# Patient Record
Sex: MALE | Race: WHITE | Employment: OTHER | URBAN - METROPOLITAN AREA
[De-identification: names, ages, dates, MRNs, and addresses within clinical notes are randomized per-mention and may not be internally consistent; named-entity substitution may affect disease eponyms.]

---

## 2017-04-26 ENCOUNTER — TELEPHONE (OUTPATIENT)
Dept: CARDIOLOGY CLINIC | Age: 82
End: 2017-04-26

## 2017-04-27 ENCOUNTER — TELEPHONE (OUTPATIENT)
Dept: CARDIOLOGY CLINIC | Age: 82
End: 2017-04-27

## 2017-05-01 ENCOUNTER — OFFICE VISIT (OUTPATIENT)
Dept: CARDIOLOGY CLINIC | Age: 82
End: 2017-05-01

## 2017-05-01 VITALS
DIASTOLIC BLOOD PRESSURE: 58 MMHG | HEART RATE: 64 BPM | OXYGEN SATURATION: 97 % | WEIGHT: 134 LBS | BODY MASS INDEX: 22.33 KG/M2 | SYSTOLIC BLOOD PRESSURE: 118 MMHG | HEIGHT: 65 IN

## 2017-05-01 DIAGNOSIS — I25.10 CORONARY ARTERY DISEASE INVOLVING NATIVE CORONARY ARTERY OF NATIVE HEART WITHOUT ANGINA PECTORIS: Primary | ICD-10-CM

## 2017-05-01 DIAGNOSIS — R55 SYNCOPE, UNSPECIFIED SYNCOPE TYPE: ICD-10-CM

## 2017-05-01 DIAGNOSIS — I48.0 PAROXYSMAL ATRIAL FIBRILLATION (HCC): ICD-10-CM

## 2017-05-01 PROCEDURE — 4040F PNEUMOC VAC/ADMIN/RCVD: CPT | Performed by: NURSE PRACTITIONER

## 2017-05-01 PROCEDURE — 1123F ACP DISCUSS/DSCN MKR DOCD: CPT | Performed by: NURSE PRACTITIONER

## 2017-05-01 PROCEDURE — 99214 OFFICE O/P EST MOD 30 MIN: CPT | Performed by: NURSE PRACTITIONER

## 2017-05-01 PROCEDURE — G8598 ASA/ANTIPLAT THER USED: HCPCS | Performed by: NURSE PRACTITIONER

## 2017-05-01 PROCEDURE — G8419 CALC BMI OUT NRM PARAM NOF/U: HCPCS | Performed by: NURSE PRACTITIONER

## 2017-05-01 PROCEDURE — G8427 DOCREV CUR MEDS BY ELIG CLIN: HCPCS | Performed by: NURSE PRACTITIONER

## 2017-05-01 PROCEDURE — 1036F TOBACCO NON-USER: CPT | Performed by: NURSE PRACTITIONER

## 2017-05-02 ENCOUNTER — TELEPHONE (OUTPATIENT)
Dept: CARDIOLOGY CLINIC | Age: 82
End: 2017-05-02

## 2017-06-12 ENCOUNTER — OFFICE VISIT (OUTPATIENT)
Dept: CARDIOLOGY CLINIC | Age: 82
End: 2017-06-12

## 2017-06-12 VITALS
HEIGHT: 65 IN | SYSTOLIC BLOOD PRESSURE: 112 MMHG | OXYGEN SATURATION: 94 % | DIASTOLIC BLOOD PRESSURE: 60 MMHG | HEART RATE: 60 BPM

## 2017-06-12 DIAGNOSIS — Z86.73 HISTORY OF CVA (CEREBROVASCULAR ACCIDENT): ICD-10-CM

## 2017-06-12 DIAGNOSIS — R55 SYNCOPE AND COLLAPSE: ICD-10-CM

## 2017-06-12 DIAGNOSIS — I48.0 PAROXYSMAL ATRIAL FIBRILLATION (HCC): Primary | ICD-10-CM

## 2017-06-12 DIAGNOSIS — Z45.09 ENCOUNTER FOR ELECTRONIC ANALYSIS OF REVEAL EVENT RECORDER: ICD-10-CM

## 2017-06-12 PROCEDURE — 93291 INTERROG DEV EVAL SCRMS IP: CPT | Performed by: INTERNAL MEDICINE

## 2017-06-12 PROCEDURE — G8427 DOCREV CUR MEDS BY ELIG CLIN: HCPCS | Performed by: INTERNAL MEDICINE

## 2017-06-12 PROCEDURE — G8598 ASA/ANTIPLAT THER USED: HCPCS | Performed by: INTERNAL MEDICINE

## 2017-06-12 PROCEDURE — 4040F PNEUMOC VAC/ADMIN/RCVD: CPT | Performed by: INTERNAL MEDICINE

## 2017-06-12 PROCEDURE — 1036F TOBACCO NON-USER: CPT | Performed by: INTERNAL MEDICINE

## 2017-06-12 PROCEDURE — 99214 OFFICE O/P EST MOD 30 MIN: CPT | Performed by: INTERNAL MEDICINE

## 2017-06-12 PROCEDURE — G8420 CALC BMI NORM PARAMETERS: HCPCS | Performed by: INTERNAL MEDICINE

## 2017-06-12 PROCEDURE — 93000 ELECTROCARDIOGRAM COMPLETE: CPT | Performed by: INTERNAL MEDICINE

## 2017-06-12 PROCEDURE — 1123F ACP DISCUSS/DSCN MKR DOCD: CPT | Performed by: INTERNAL MEDICINE

## 2017-12-08 ENCOUNTER — PROCEDURE VISIT (OUTPATIENT)
Dept: CARDIOLOGY CLINIC | Age: 82
End: 2017-12-08

## 2017-12-08 ENCOUNTER — OFFICE VISIT (OUTPATIENT)
Dept: CARDIOLOGY CLINIC | Age: 82
End: 2017-12-08

## 2017-12-08 VITALS
SYSTOLIC BLOOD PRESSURE: 120 MMHG | HEART RATE: 66 BPM | OXYGEN SATURATION: 93 % | DIASTOLIC BLOOD PRESSURE: 78 MMHG | HEIGHT: 65 IN

## 2017-12-08 DIAGNOSIS — R55 SYNCOPE AND COLLAPSE: Primary | ICD-10-CM

## 2017-12-08 DIAGNOSIS — Z45.09 ENCOUNTER FOR ELECTRONIC ANALYSIS OF REVEAL EVENT RECORDER: ICD-10-CM

## 2017-12-08 DIAGNOSIS — R55 SYNCOPE AND COLLAPSE: ICD-10-CM

## 2017-12-08 DIAGNOSIS — I48.0 PAROXYSMAL ATRIAL FIBRILLATION (HCC): ICD-10-CM

## 2017-12-08 PROCEDURE — 4040F PNEUMOC VAC/ADMIN/RCVD: CPT | Performed by: INTERNAL MEDICINE

## 2017-12-08 PROCEDURE — 99213 OFFICE O/P EST LOW 20 MIN: CPT | Performed by: INTERNAL MEDICINE

## 2017-12-08 PROCEDURE — G8484 FLU IMMUNIZE NO ADMIN: HCPCS | Performed by: INTERNAL MEDICINE

## 2017-12-08 PROCEDURE — 1123F ACP DISCUSS/DSCN MKR DOCD: CPT | Performed by: INTERNAL MEDICINE

## 2017-12-08 PROCEDURE — G8598 ASA/ANTIPLAT THER USED: HCPCS | Performed by: INTERNAL MEDICINE

## 2017-12-08 PROCEDURE — G8421 BMI NOT CALCULATED: HCPCS | Performed by: INTERNAL MEDICINE

## 2017-12-08 PROCEDURE — G8427 DOCREV CUR MEDS BY ELIG CLIN: HCPCS | Performed by: INTERNAL MEDICINE

## 2017-12-08 PROCEDURE — 1036F TOBACCO NON-USER: CPT | Performed by: INTERNAL MEDICINE

## 2017-12-08 PROCEDURE — 93291 INTERROG DEV EVAL SCRMS IP: CPT | Performed by: INTERNAL MEDICINE

## 2017-12-08 NOTE — PROGRESS NOTES
procedure(V57.89); Personal history of surgery to other organs; Pneumonia; Sepsis(995.91); Stricture and stenosis of esophagus; Type II or unspecified type diabetes mellitus without mention of complication, not stated as uncontrolled; Type II or unspecified type diabetes mellitus without mention of complication, not stated as uncontrolled; Unspecified hypothyroidism; and Urinary tract infection, site not specified. Surgical History:   has a past surgical history that includes hernia repair; Cardiac catheterization (8/11/15); and other surgical history (8/11/15). Social History:   reports that he has quit smoking. He has quit using smokeless tobacco. He reports that he does not drink alcohol or use drugs. Family History:  family history is not on file.      Home Medications:  Outpatient Encounter Prescriptions as of 12/8/2017   Medication Sig Dispense Refill    glimepiride (AMARYL) 1 MG tablet Take 1 mg by mouth every morning (before breakfast)      acetaminophen (TYLENOL) 325 MG tablet Take 650 mg by mouth every 6 hours as needed for Pain      atorvastatin (LIPITOR) 40 MG tablet Take 40 mg by mouth daily      Calcium Carbonate-Vitamin D (CALTRATE 600+D) 600-400 MG-UNIT CHEW Take by mouth      ferrous sulfate 325 (65 FE) MG tablet Take 325 mg by mouth daily (with breakfast)      pantoprazole sodium (PROTONIX) 40 MG PACK packet Take 40 mg by mouth every morning (before breakfast)      guaiFENesin-dextromethorphan (ROBITUSSIN DM) 100-10 MG/5ML syrup Take 5 mLs by mouth 3 times daily as needed for Cough      Cholecalciferol (VITAMIN D3) 2000 UNITS CAPS Take by mouth      lisinopril (PRINIVIL;ZESTRIL) 5 MG tablet TAKE 1 TABLET BY MOUTH DAILY 90 tablet 3    levothyroxine (SYNTHROID) 50 MCG tablet TAKE 1 TABLET BY MOUTH DAILY 90 tablet 3    isosorbide mononitrate (IMDUR) 30 MG CR tablet TAKE 1 TABLET BY MOUTH DAILY 90 tablet 5    ammonium lactate (LAC-HYDRIN) 12 % lotion Apply  topically 3 times daily

## 2017-12-08 NOTE — PROGRESS NOTES
Interrogation of the device shows normal function. See interrogation for details. The pt will see Dr Ashanti Pérez today.

## 2018-09-29 ENCOUNTER — HOSPITAL ENCOUNTER (EMERGENCY)
Age: 83
Discharge: HOME | End: 2018-09-29
Payer: MEDICARE

## 2018-09-29 ENCOUNTER — APPOINTMENT (OUTPATIENT)
Dept: CT IMAGING | Age: 83
End: 2018-09-29
Payer: MEDICARE

## 2018-09-29 ENCOUNTER — APPOINTMENT (OUTPATIENT)
Dept: GENERAL RADIOLOGY | Age: 83
End: 2018-09-29
Payer: MEDICARE

## 2018-09-29 VITALS
WEIGHT: 129.85 LBS | RESPIRATION RATE: 18 BRPM | SYSTOLIC BLOOD PRESSURE: 123 MMHG | DIASTOLIC BLOOD PRESSURE: 64 MMHG | TEMPERATURE: 97.8 F | OXYGEN SATURATION: 97 % | HEART RATE: 58 BPM | BODY MASS INDEX: 21.61 KG/M2

## 2018-09-29 DIAGNOSIS — R82.71 BACTERIURIA: ICD-10-CM

## 2018-09-29 DIAGNOSIS — R05.9 COUGH: ICD-10-CM

## 2018-09-29 DIAGNOSIS — S00.83XA TRAUMATIC HEMATOMA OF FOREHEAD, INITIAL ENCOUNTER: ICD-10-CM
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
## 2018-09-29 DIAGNOSIS — S89.91XA RIGHT KNEE INJURY, INITIAL ENCOUNTER: ICD-10-CM

## 2018-09-29 LAB
BILIRUBIN URINE: NEGATIVE
BLOOD, URINE: ABNORMAL
CLARITY: ABNORMAL
COLOR: YELLOW
EPITHELIAL CELLS, UA: 0 /HPF (ref 0–5)
GLUCOSE URINE: NEGATIVE MG/DL
HYALINE CASTS: 0 /LPF (ref 0–8)
KETONES, URINE: NEGATIVE MG/DL
LEUKOCYTE ESTERASE, URINE: ABNORMAL
MICROSCOPIC EXAMINATION: YES
NITRITE, URINE: NEGATIVE
PH UA: 6
PROTEIN UA: NEGATIVE MG/DL
RBC UA: 1 /HPF (ref 0–4)
SPECIFIC GRAVITY UA: 1.01
URINE REFLEX TO CULTURE: YES
URINE TYPE: ABNORMAL
UROBILINOGEN, URINE: 0.2 E.U./DL
WBC UA: 26 /HPF (ref 0–5)

## 2018-09-29 PROCEDURE — 81001 URINALYSIS AUTO W/SCOPE: CPT

## 2018-09-29 PROCEDURE — 87077 CULTURE AEROBIC IDENTIFY: CPT

## 2018-09-29 PROCEDURE — 72125 CT NECK SPINE W/O DYE: CPT

## 2018-09-29 PROCEDURE — 87086 URINE CULTURE/COLONY COUNT: CPT

## 2018-09-29 PROCEDURE — 99284 EMERGENCY DEPT VISIT MOD MDM: CPT

## 2018-09-29 PROCEDURE — 87186 SC STD MICRODIL/AGAR DIL: CPT

## 2018-09-29 PROCEDURE — 70450 CT HEAD/BRAIN W/O DYE: CPT

## 2018-09-29 PROCEDURE — 90471 IMMUNIZATION ADMIN: CPT | Performed by: PHYSICIAN ASSISTANT

## 2018-09-29 PROCEDURE — 6360000002 HC RX W HCPCS: Performed by: PHYSICIAN ASSISTANT

## 2018-09-29 PROCEDURE — 90715 TDAP VACCINE 7 YRS/> IM: CPT | Performed by: PHYSICIAN ASSISTANT

## 2018-09-29 PROCEDURE — 73560 X-RAY EXAM OF KNEE 1 OR 2: CPT

## 2018-09-29 PROCEDURE — 71045 X-RAY EXAM CHEST 1 VIEW: CPT

## 2018-09-29 PROCEDURE — 4500000024 HC ED LEVEL 4 PROCEDURE

## 2018-09-29 RX ORDER — LEVOFLOXACIN 750 MG/1
750 TABLET ORAL DAILY
Qty: 5 TABLET | Refills: 0 | Status: SHIPPED | OUTPATIENT
Start: 2018-09-29 | End: 2018-10-04

## 2018-09-29 RX ORDER — GUAIFENESIN/DEXTROMETHORPHAN 100-10MG/5
5 SYRUP ORAL 3 TIMES DAILY PRN
Qty: 120 ML | Refills: 0 | Status: SHIPPED | OUTPATIENT
Start: 2018-09-29 | End: 2018-10-09

## 2018-09-29 RX ADMIN — TETANUS TOXOID, REDUCED DIPHTHERIA TOXOID AND ACELLULAR PERTUSSIS VACCINE, ADSORBED 0.5 ML: 5; 2.5; 8; 8; 2.5 SUSPENSION INTRAMUSCULAR at 09:22

## 2018-09-29 ASSESSMENT — PAIN DESCRIPTION - PAIN TYPE: TYPE: ACUTE PAIN

## 2018-09-29 ASSESSMENT — PAIN SCALES - GENERAL: PAINLEVEL_OUTOF10: 5

## 2018-09-29 ASSESSMENT — PAIN DESCRIPTION - LOCATION: LOCATION: HEAD

## 2018-09-29 NOTE — ED NOTES
Fall risk screening completed. Fall risk bracelet applied to patient. Non-skid socks provided and placed on patient. The call light is within the patient's reach, and instructions for use were provided. The bed is in the lowest position with wheels locked. The patient has been advised to notify staff, using the call light, if there is a need to get up or use restroom. The patient verbalized understanding of safety precautions and how to contact staff for assistance.         Cristian Petersen RN  09/29/18 [id number]

## 2018-09-29 NOTE — ED PROVIDER NOTES
times daily as needed for Wheezing or Shortness of Breath. ALENDRONATE (FOSAMAX) 70 MG TABLET    TAKE 1 TABLET BY MOUTH ONCE A WEEK AS DIRECTED    AMLODIPINE (NORVASC) 10 MG TABLET    TAKE 1 TABLET BY MOUTH EVERY DAY for blood pressure    AMMONIUM LACTATE (LAC-HYDRIN) 12 % LOTION    Apply  topically 3 times daily as needed for Dry Skin. Apply topically daily. ATORVASTATIN (LIPITOR) 40 MG TABLET    Take 40 mg by mouth daily    CALCIUM CARBONATE-VITAMIN D (CALTRATE 600+D) 600-400 MG-UNIT CHEW    Take by mouth    CHOLECALCIFEROL (VITAMIN D3) 2000 UNITS CAPS    Take by mouth    CLOPIDOGREL (PLAVIX) 75 MG TABLET    TAKE ONE TABLET BY MOUTH DAILY    FERROUS SULFATE 325 (65 FE) MG TABLET    Take 325 mg by mouth daily (with breakfast)    GLIMEPIRIDE (AMARYL) 1 MG TABLET    Take 1 mg by mouth every morning (before breakfast)    GUAIFENESIN-DEXTROMETHORPHAN (ROBITUSSIN DM) 100-10 MG/5ML SYRUP    Take 5 mLs by mouth 3 times daily as needed for Cough    HYDROCHLOROTHIAZIDE (HYDRODIURIL) 50 MG TABLET    TAKE 1 TABLET BY MOUTH DAILY    ISOSORBIDE MONONITRATE (IMDUR) 30 MG CR TABLET    TAKE 1 TABLET BY MOUTH DAILY    LEVOTHYROXINE (SYNTHROID) 50 MCG TABLET    TAKE 1 TABLET BY MOUTH DAILY    LISINOPRIL (PRINIVIL;ZESTRIL) 5 MG TABLET    TAKE 1 TABLET BY MOUTH DAILY    NITROGLYCERIN (NITROSTAT) 0.4 MG SL TABLET    Place 1 tablet under the tongue every 5 minutes as needed for Chest pain. PANTOPRAZOLE SODIUM (PROTONIX) 40 MG PACK PACKET    Take 40 mg by mouth every morning (before breakfast)    POTASSIUM CHLORIDE (K-DUR) 10 MEQ TABLET    Take 1 tablet by mouth daily (with breakfast) for 7 days. ALLERGIES     Ppd [tuberculin purified protein derivative]    FAMILY HISTORY     History reviewed. No pertinent family history. No family status information on file. SOCIAL HISTORY      reports that he has quit smoking. He has quit using smokeless tobacco. He reports that he does not drink alcohol or use drugs.     PHYSICAL

## 2018-09-29 NOTE — ED NOTES
Patient returned from [email or web address] and [facility].      Ruddy Lim RN  09/29/18 8222

## 2018-10-01 LAB
ORGANISM: ABNORMAL
URINE CULTURE, ROUTINE: ABNORMAL
URINE CULTURE, ROUTINE: ABNORMAL

## 2019-05-27 ENCOUNTER — APPOINTMENT (OUTPATIENT)
Dept: GENERAL RADIOLOGY | Age: 84
End: 2019-05-27
Payer: MEDICARE

## 2019-05-27 ENCOUNTER — HOSPITAL ENCOUNTER (EMERGENCY)
Age: 84
Discharge: HOME | End: 2019-05-27
Attending: EMERGENCY MEDICINE
Payer: MEDICARE

## 2019-05-27 VITALS
DIASTOLIC BLOOD PRESSURE: 62 MMHG | TEMPERATURE: 98.5 F | HEART RATE: 79 BPM | WEIGHT: 116.84 LBS | RESPIRATION RATE: 16 BRPM | OXYGEN SATURATION: 97 % | SYSTOLIC BLOOD PRESSURE: 129 MMHG | BODY MASS INDEX: 19.44 KG/M2

## 2019-05-27 DIAGNOSIS — J18.9 PNEUMONIA DUE TO ORGANISM: Primary | ICD-10-CM

## 2019-05-27 DIAGNOSIS — R41.82 ALTERED MENTAL STATUS, UNSPECIFIED ALTERED MENTAL STATUS TYPE: ICD-10-CM

## 2019-05-27 DIAGNOSIS — R50.9 FEVER, UNSPECIFIED FEVER CAUSE: ICD-10-CM

## 2019-05-27 LAB
AMORPHOUS: ABNORMAL /HPF
BILIRUBIN URINE: NEGATIVE
BLOOD, URINE: ABNORMAL
CLARITY: ABNORMAL
COLOR: YELLOW
EPITHELIAL CELLS, UA: 4 /HPF (ref 0–5)
GLUCOSE URINE: NEGATIVE MG/DL
KETONES, URINE: NEGATIVE MG/DL
LEUKOCYTE ESTERASE, URINE: NEGATIVE
MICROSCOPIC EXAMINATION: YES
NITRITE, URINE: NEGATIVE
PH UA: 5 (ref 5–8)
PROTEIN UA: 100 MG/DL
RBC UA: 2 /HPF (ref 0–4)
SPECIFIC GRAVITY UA: 1.02 (ref 1–1.03)
URINE REFLEX TO CULTURE: ABNORMAL
URINE TYPE: ABNORMAL
UROBILINOGEN, URINE: 0.2 E.U./DL
WBC UA: 1 /HPF (ref 0–5)

## 2019-05-27 PROCEDURE — 99285 EMERGENCY DEPT VISIT HI MDM: CPT

## 2019-05-27 PROCEDURE — 81001 URINALYSIS AUTO W/SCOPE: CPT

## 2019-05-27 PROCEDURE — 71045 X-RAY EXAM CHEST 1 VIEW: CPT

## 2019-05-27 PROCEDURE — 6370000000 HC RX 637 (ALT 250 FOR IP): Performed by: EMERGENCY MEDICINE

## 2019-05-27 RX ORDER — AZITHROMYCIN 250 MG/1
250 TABLET, FILM COATED ORAL SEE ADMIN INSTRUCTIONS
Qty: 6 TABLET | Refills: 0 | Status: SHIPPED | OUTPATIENT
Start: 2019-05-27 | End: 2019-06-01

## 2019-05-27 RX ORDER — ACETAMINOPHEN 650 MG/1
650 SUPPOSITORY RECTAL ONCE
Status: COMPLETED | OUTPATIENT
Start: 2019-05-27 | End: 2019-05-27

## 2019-05-27 RX ADMIN — ACETAMINOPHEN 650 MG: 650 SUPPOSITORY RECTAL at 16:25

## 2019-05-27 ASSESSMENT — PAIN SCALES - GENERAL
PAINLEVEL_OUTOF10: 0
PAINLEVEL_OUTOF10: 0

## 2019-05-27 NOTE — ED NOTES
[facility].      Shelbi Gallo RN  05/27/19 8300

## 2019-05-27 NOTE — ED PROVIDER NOTES
CHIEF COMPLAINT  Altered Mental Status (Patient sent in from [facility] for altered mental status. Per [facility] patient is sometimes combative and verbally abusive. Patient is refusing care at the nursing home and here at this time. Patient has a rectal temp of 102.7 at this time. Patient is a dnr-cc and is in [facility]ice [facility]. Patient is refusing care at this time here and at the nursing home.  )      [address] [place] [address] Rd is a 80 y.o. male who presents to the ED from [facility]. Patient was sent over for concerns of altered mental status. Patient has not been talking as much per staff at nursing facility. Patient also not putting on close or using the restroom appropriately. Patient was urinating on himself. Per nursing staff at patient's care facility, patient requested to come to the hospital.  Patient does occasionally answer questions with nodding his head yes and no. Patient oriented to person and place but not time. Patient is DNR CC and DNR CC paperwork is in the hospital with the patient. No other complaints, modifying factors or associated symptoms. Nursing notes reviewed.    Past Medical History:   Diagnosis Date    Arthritis     Backache, unspecified     CHF (congestive heart failure) (HCC)     LVEF 40-45%    Coronary atherosclerosis of unspecified type of vessel, native or graft     Debility, unspecified     Dysphagia, oropharyngeal phase     Dysphagia, unspecified(787.20)     Esophageal reflux     H/O: CVA     left hemiparesis    Hemiplegia affecting unspecified side, late effect of cerebrovascular disease     Hyperlipidemia     Hypertension     Hypothyroid     Muscular wasting and disuse atrophy, not elsewhere classified     Nonspecific reaction to tuberculin skin test without active tuberculosis(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 Current Outpatient Medications   Medication Sig Dispense Refill    azithromycin (ZITHROMAX) 250 MG tablet Take 1 tablet by mouth See Admin Instructions for 5 days 500mg on day 1 followed by 250mg on days 2 - 5 6 tablet 0    glimepiride (AMARYL) 1 MG tablet Take 1 mg by mouth every morning (before breakfast)      acetaminophen (TYLENOL) 325 MG tablet Take 650 mg by mouth every 6 hours as needed for Pain      atorvastatin (LIPITOR) 40 MG tablet Take 40 mg by mouth daily      Calcium Carbonate-Vitamin D (CALTRATE 600+D) 600-400 MG-UNIT CHEW Take by mouth      ferrous sulfate 325 (65 FE) MG tablet Take 325 mg by mouth daily (with breakfast)      pantoprazole sodium (PROTONIX) 40 MG PACK packet Take 40 mg by mouth every morning (before breakfast)      guaiFENesin-dextromethorphan (ROBITUSSIN DM) 100-10 MG/5ML syrup Take 5 mLs by mouth 3 times daily as needed for Cough      Cholecalciferol (VITAMIN D3) 2000 UNITS CAPS Take by mouth      potassium chloride (K-DUR) 10 MEQ tablet Take 1 tablet by mouth daily (with breakfast) for 7 days. 7 tablet 0    lisinopril (PRINIVIL;ZESTRIL) 5 MG tablet TAKE 1 TABLET BY MOUTH DAILY 90 tablet 3    levothyroxine (SYNTHROID) 50 MCG tablet TAKE 1 TABLET BY MOUTH DAILY 90 tablet 3    isosorbide mononitrate (IMDUR) 30 MG CR tablet TAKE 1 TABLET BY MOUTH DAILY 90 tablet 5    ammonium lactate (LAC-HYDRIN) 12 % lotion Apply  topically 3 times daily as needed for Dry Skin. Apply topically daily.  1 Bottle 3    clopidogrel (PLAVIX) 75 MG tablet TAKE ONE TABLET BY MOUTH DAILY 30 tablet 5    amLODIPine (NORVASC) 10 MG tablet TAKE 1 TABLET BY MOUTH EVERY DAY for blood pressure 30 tablet 12    hydrochlorothiazide (HYDRODIURIL) 50 MG tablet TAKE 1 TABLET BY MOUTH DAILY 90 tablet 3    alendronate (FOSAMAX) 70 MG tablet ACS/MI    80-year-old male who is DNR CC presents from nursing facility for concerns of altered mental status. Patient was uncooperative for them and was sent for evaluation. Patient not answering questions verbally but doesn't answer with yes and nos are nodding. Patient febrile via rectal temperature. Patient was treated with Tylenol rectal suppository has refused to take anything orally. I feel patient may have a urinary tract infection and this can be treated with oral antibiotics. We'll attempt to obtain a urine sample. I do not feel it is appropriate to attempt to draw blood as this would be against patient's DNR CC as it would cost patient pain. ED Course as of May 27 2216   Mon May 27, 2019   1618 Spoke with RN, Priscilla Oliveira, who was helping care for Mr. Marie at [facility]. She states they were going to perform a workup at the facility the patient was refusing any care. Patient does have DNR CC paperwork with him. Discussed with nursing staff at [facility] that patient has a fever and will be treated with a rectal suppository. Discussed transferring patient back to nursing facility as there facility is capable of taking care of patient and performing testing as needed. They state understanding and agree with transfer back to facility. Will check urine if possible prior to transfer. [DS]   9220 Patient much more cooperative. Patient asking for water. Patient allowed for straight cath as well. We'll check urine and then plan for discharge back to his facility. [DS]   1875 Patient's chest x-ray shows possible pneumonia. Patient prescribed a course of azithromycin. No further workup at this time as patient is DNR CC.    [DS]   [address] Patient's family members at bedside. Discussed care plan and not obtaining labs or poking patient for blood as he is DNR CC. They did agree with this care plan.   Discussed that we treat patient with Tylenol and will be starting him on azithromycin for pneumonia. They're comfortable with treating the patient with antibiotic and Tylenol as needed to keep patient comfortable. Patient much more cooperative after fever decreased with Tylenol. Patient will be discharged back to facility. [DS]      ED Course User Index  [DS] Rashid Biggs MD         Patient was given scripts for the following medications. I counseled patient how to take these medications. Discharge Medication List as of 5/27/2019  7:03 PM      START taking these medications    Details   azithromycin (ZITHROMAX) 250 MG tablet Take 1 tablet by mouth See Admin Instructions for 5 days 500mg on day 1 followed by 250mg on days 2 - 5, Disp-6 tablet, R-0Print                 CLINICAL IMPRESSION  1. Pneumonia due to organism    2. Fever, unspecified fever cause    3. Altered mental status, unspecified altered mental status type        Blood pressure 129/62, pulse 79, temperature 98.5 °F (36.9 °C), temperature source Axillary, resp. rate 16, weight 116 lb 13.5 oz (53 kg), SpO2 97 %. DISPOSITION  Patient was discharged to home in good condition. Disclaimer: All medical record entries made by [employer] dictation.       (Please note that this note was completed with a voice recognition program. Every attempt was made to edit the dictations, but inevitably there remain words that are mis-transcribed.)            Rashid Biggs MD  05/27/19 3278

## 2019-07-26 ENCOUNTER — APPOINTMENT (OUTPATIENT)
Dept: GENERAL RADIOLOGY | Age: 84
DRG: 280 | End: 2019-07-26
Payer: MEDICARE

## 2019-07-26 ENCOUNTER — HOSPITAL ENCOUNTER (INPATIENT)
Age: 84
LOS: 4 days | Discharge: NURSING, REHAB OR LONG-TERM CARE | DRG: 280 | End: 2019-07-30
Attending: EMERGENCY MEDICINE | Admitting: INTERNAL MEDICINE
Payer: MEDICARE

## 2019-07-26 DIAGNOSIS — N17.9 AKI (ACUTE KIDNEY INJURY) (HCC): ICD-10-CM

## 2019-07-26 DIAGNOSIS — I21.4 NSTEMI (NON-ST ELEVATED MYOCARDIAL INFARCTION) (HCC): ICD-10-CM

## 2019-07-26 DIAGNOSIS — E87.5 HYPERKALEMIA: ICD-10-CM

## 2019-07-26 DIAGNOSIS — R06.02 SHORTNESS OF BREATH: Primary | ICD-10-CM

## 2019-07-26 DIAGNOSIS — I50.9 CONGESTIVE HEART FAILURE, UNSPECIFIED HF CHRONICITY, UNSPECIFIED HEART FAILURE TYPE (HCC): ICD-10-CM

## 2019-07-26 LAB
A/G RATIO: 1 (ref 1.1–2.2)
ALBUMIN SERPL-MCNC: 3.7 G/DL (ref 3.4–5)
ALP BLD-CCNC: 84 U/L (ref 40–129)
ALT SERPL-CCNC: 11 U/L (ref 10–40)
ANION GAP SERPL CALCULATED.3IONS-SCNC: 15 MMOL/L (ref 3–16)
ANISOCYTOSIS: ABNORMAL
APTT: 26.8 SEC (ref 26–36)
APTT: 95.1 SEC (ref 26–36)
AST SERPL-CCNC: 26 U/L (ref 15–37)
BASOPHILS ABSOLUTE: 0 K/UL (ref 0–0.2)
BASOPHILS RELATIVE PERCENT: 0.3 %
BILIRUB SERPL-MCNC: 0.7 MG/DL (ref 0–1)
BUN BLDV-MCNC: 69 MG/DL (ref 7–20)
CALCIUM SERPL-MCNC: 9.5 MG/DL (ref 8.3–10.6)
CHLORIDE BLD-SCNC: 105 MMOL/L (ref 99–110)
CO2: 20 MMOL/L (ref 21–32)
CREAT SERPL-MCNC: 1.8 MG/DL (ref 0.8–1.3)
EKG ATRIAL RATE: 208 BPM
EKG ATRIAL RATE: 83 BPM
EKG DIAGNOSIS: NORMAL
EKG DIAGNOSIS: NORMAL
EKG Q-T INTERVAL: 412 MS
EKG Q-T INTERVAL: 414 MS
EKG QRS DURATION: 122 MS
EKG QRS DURATION: 132 MS
EKG QTC CALCULATION (BAZETT): 449 MS
EKG QTC CALCULATION (BAZETT): 460 MS
EKG R AXIS: -47 DEGREES
EKG R AXIS: -72 DEGREES
EKG T AXIS: 1 DEGREES
EKG T AXIS: 85 DEGREES
EKG VENTRICULAR RATE: 71 BPM
EKG VENTRICULAR RATE: 75 BPM
EOSINOPHILS ABSOLUTE: 0 K/UL (ref 0–0.6)
EOSINOPHILS RELATIVE PERCENT: 0 %
GFR AFRICAN AMERICAN: 43
GFR NON-AFRICAN AMERICAN: 36
GLOBULIN: 3.6 G/DL
GLUCOSE BLD-MCNC: 112 MG/DL (ref 70–99)
GLUCOSE BLD-MCNC: 116 MG/DL (ref 70–99)
GLUCOSE BLD-MCNC: 126 MG/DL (ref 70–99)
GLUCOSE BLD-MCNC: 306 MG/DL (ref 70–99)
GLUCOSE BLD-MCNC: 97 MG/DL (ref 70–99)
HCT VFR BLD CALC: 33.9 % (ref 40.5–52.5)
HCT VFR BLD CALC: 34.1 % (ref 40.5–52.5)
HEMOGLOBIN: 10.8 G/DL (ref 13.5–17.5)
HEMOGLOBIN: 10.9 G/DL (ref 13.5–17.5)
INR BLD: 1.16 (ref 0.86–1.14)
LACTIC ACID: 1.6 MMOL/L (ref 0.4–2)
LYMPHOCYTES ABSOLUTE: 0.7 K/UL (ref 1–5.1)
LYMPHOCYTES RELATIVE PERCENT: 7.3 %
MCH RBC QN AUTO: 28.8 PG (ref 26–34)
MCH RBC QN AUTO: 28.9 PG (ref 26–34)
MCHC RBC AUTO-ENTMCNC: 31.8 G/DL (ref 31–36)
MCHC RBC AUTO-ENTMCNC: 31.8 G/DL (ref 31–36)
MCV RBC AUTO: 90.6 FL (ref 80–100)
MCV RBC AUTO: 90.9 FL (ref 80–100)
MONOCYTES ABSOLUTE: 0.7 K/UL (ref 0–1.3)
MONOCYTES RELATIVE PERCENT: 6.9 %
NEUTROPHILS ABSOLUTE: 8.6 K/UL (ref 1.7–7.7)
NEUTROPHILS RELATIVE PERCENT: 85.5 %
OVALOCYTES: ABNORMAL
PDW BLD-RTO: 16.9 % (ref 12.4–15.4)
PDW BLD-RTO: 17.1 % (ref 12.4–15.4)
PERFORMED ON: ABNORMAL
PERFORMED ON: NORMAL
PLATELET # BLD: 211 K/UL (ref 135–450)
PLATELET # BLD: 264 K/UL (ref 135–450)
PLATELET SLIDE REVIEW: ADEQUATE
PMV BLD AUTO: 7.6 FL (ref 5–10.5)
PMV BLD AUTO: 8.4 FL (ref 5–10.5)
POTASSIUM REFLEX MAGNESIUM: 6.1 MMOL/L (ref 3.5–5.1)
PRO-BNP: ABNORMAL PG/ML (ref 0–449)
PROTHROMBIN TIME: 13.2 SEC (ref 9.8–13)
RBC # BLD: 3.74 M/UL (ref 4.2–5.9)
RBC # BLD: 3.75 M/UL (ref 4.2–5.9)
SLIDE REVIEW: ABNORMAL
SODIUM BLD-SCNC: 140 MMOL/L (ref 136–145)
TOTAL PROTEIN: 7.3 G/DL (ref 6.4–8.2)
TROPONIN: 0.99 NG/ML
TROPONIN: 1.28 NG/ML
WBC # BLD: 10 K/UL (ref 4–11)
WBC # BLD: 10.2 K/UL (ref 4–11)

## 2019-07-26 PROCEDURE — 83605 ASSAY OF LACTIC ACID: CPT

## 2019-07-26 PROCEDURE — 2060000000 HC ICU INTERMEDIATE R&B

## 2019-07-26 PROCEDURE — 85610 PROTHROMBIN TIME: CPT

## 2019-07-26 PROCEDURE — 85730 THROMBOPLASTIN TIME PARTIAL: CPT

## 2019-07-26 PROCEDURE — 2580000003 HC RX 258: Performed by: EMERGENCY MEDICINE

## 2019-07-26 PROCEDURE — 99221 1ST HOSP IP/OBS SF/LOW 40: CPT | Performed by: INTERNAL MEDICINE

## 2019-07-26 PROCEDURE — 6370000000 HC RX 637 (ALT 250 FOR IP): Performed by: EMERGENCY MEDICINE

## 2019-07-26 PROCEDURE — 85027 COMPLETE CBC AUTOMATED: CPT

## 2019-07-26 PROCEDURE — 99285 EMERGENCY DEPT VISIT HI MDM: CPT

## 2019-07-26 PROCEDURE — 2700000000 HC OXYGEN THERAPY PER DAY

## 2019-07-26 PROCEDURE — 6360000002 HC RX W HCPCS: Performed by: INTERNAL MEDICINE

## 2019-07-26 PROCEDURE — 71045 X-RAY EXAM CHEST 1 VIEW: CPT

## 2019-07-26 PROCEDURE — 36415 COLL VENOUS BLD VENIPUNCTURE: CPT

## 2019-07-26 PROCEDURE — 93005 ELECTROCARDIOGRAM TRACING: CPT | Performed by: NURSE PRACTITIONER

## 2019-07-26 PROCEDURE — 93010 ELECTROCARDIOGRAM REPORT: CPT | Performed by: INTERNAL MEDICINE

## 2019-07-26 PROCEDURE — 93005 ELECTROCARDIOGRAM TRACING: CPT | Performed by: EMERGENCY MEDICINE

## 2019-07-26 PROCEDURE — 96375 TX/PRO/DX INJ NEW DRUG ADDON: CPT

## 2019-07-26 PROCEDURE — 87040 BLOOD CULTURE FOR BACTERIA: CPT

## 2019-07-26 PROCEDURE — 80053 COMPREHEN METABOLIC PANEL: CPT

## 2019-07-26 PROCEDURE — 6360000002 HC RX W HCPCS: Performed by: EMERGENCY MEDICINE

## 2019-07-26 PROCEDURE — 85025 COMPLETE CBC W/AUTO DIFF WBC: CPT

## 2019-07-26 PROCEDURE — 94761 N-INVAS EAR/PLS OXIMETRY MLT: CPT

## 2019-07-26 PROCEDURE — 96365 THER/PROPH/DIAG IV INF INIT: CPT

## 2019-07-26 PROCEDURE — 84484 ASSAY OF TROPONIN QUANT: CPT

## 2019-07-26 PROCEDURE — 83880 ASSAY OF NATRIURETIC PEPTIDE: CPT

## 2019-07-26 RX ORDER — ACETAMINOPHEN 325 MG/1
650 TABLET ORAL EVERY 6 HOURS PRN
Status: DISCONTINUED | OUTPATIENT
Start: 2019-07-26 | End: 2019-07-30 | Stop reason: HOSPADM

## 2019-07-26 RX ORDER — SODIUM POLYSTYRENE SULFONATE 15 G/60ML
15 SUSPENSION ORAL; RECTAL ONCE
Status: DISCONTINUED | OUTPATIENT
Start: 2019-07-26 | End: 2019-07-30 | Stop reason: HOSPADM

## 2019-07-26 RX ORDER — ALBUTEROL SULFATE 90 UG/1
2 AEROSOL, METERED RESPIRATORY (INHALATION) 3 TIMES DAILY PRN
Status: DISCONTINUED | OUTPATIENT
Start: 2019-07-26 | End: 2019-07-30 | Stop reason: HOSPADM

## 2019-07-26 RX ORDER — HEPARIN SODIUM 1000 [USP'U]/ML
30 INJECTION, SOLUTION INTRAVENOUS; SUBCUTANEOUS PRN
Status: DISCONTINUED | OUTPATIENT
Start: 2019-07-26 | End: 2019-07-26 | Stop reason: SDUPTHER

## 2019-07-26 RX ORDER — FUROSEMIDE 10 MG/ML
40 INJECTION INTRAMUSCULAR; INTRAVENOUS ONCE
Status: COMPLETED | OUTPATIENT
Start: 2019-07-26 | End: 2019-07-26

## 2019-07-26 RX ORDER — DEXTROSE MONOHYDRATE 25 G/50ML
12.5 INJECTION, SOLUTION INTRAVENOUS PRN
Status: DISCONTINUED | OUTPATIENT
Start: 2019-07-26 | End: 2019-07-30 | Stop reason: HOSPADM

## 2019-07-26 RX ORDER — LOPERAMIDE HYDROCHLORIDE 2 MG/1
4 CAPSULE ORAL EVERY 6 HOURS PRN
Status: ON HOLD | COMMUNITY
End: 2019-10-22 | Stop reason: HOSPADM

## 2019-07-26 RX ORDER — PROMETHAZINE HYDROCHLORIDE 25 MG/1
25 TABLET ORAL EVERY 6 HOURS PRN
Status: ON HOLD | COMMUNITY
End: 2019-10-22 | Stop reason: HOSPADM

## 2019-07-26 RX ORDER — IPRATROPIUM BROMIDE AND ALBUTEROL SULFATE 2.5; .5 MG/3ML; MG/3ML
1 SOLUTION RESPIRATORY (INHALATION) EVERY 4 HOURS PRN
Status: ON HOLD | COMMUNITY
End: 2019-10-22 | Stop reason: HOSPADM

## 2019-07-26 RX ORDER — DEXTROSE MONOHYDRATE 25 G/50ML
25 INJECTION, SOLUTION INTRAVENOUS ONCE
Status: COMPLETED | OUTPATIENT
Start: 2019-07-26 | End: 2019-07-26

## 2019-07-26 RX ORDER — ASPIRIN 325 MG
325 TABLET ORAL ONCE
Status: DISCONTINUED | OUTPATIENT
Start: 2019-07-26 | End: 2019-07-30 | Stop reason: HOSPADM

## 2019-07-26 RX ORDER — NICOTINE POLACRILEX 4 MG
15 LOZENGE BUCCAL PRN
Status: DISCONTINUED | OUTPATIENT
Start: 2019-07-26 | End: 2019-07-30 | Stop reason: HOSPADM

## 2019-07-26 RX ORDER — ASPIRIN 81 MG/1
81 TABLET, CHEWABLE ORAL DAILY
Status: DISCONTINUED | OUTPATIENT
Start: 2019-07-27 | End: 2019-07-30 | Stop reason: HOSPADM

## 2019-07-26 RX ORDER — HYDRALAZINE HYDROCHLORIDE 10 MG/1
10 TABLET, FILM COATED ORAL 3 TIMES DAILY
Status: ON HOLD | COMMUNITY
End: 2019-07-30 | Stop reason: HOSPADM

## 2019-07-26 RX ORDER — DEXTROSE MONOHYDRATE 50 MG/ML
100 INJECTION, SOLUTION INTRAVENOUS PRN
Status: DISCONTINUED | OUTPATIENT
Start: 2019-07-26 | End: 2019-07-30 | Stop reason: HOSPADM

## 2019-07-26 RX ORDER — HEPARIN SODIUM 1000 [USP'U]/ML
60 INJECTION, SOLUTION INTRAVENOUS; SUBCUTANEOUS ONCE
Status: DISCONTINUED | OUTPATIENT
Start: 2019-07-26 | End: 2019-07-26 | Stop reason: SDUPTHER

## 2019-07-26 RX ORDER — ALLOPURINOL 100 MG/1
100 TABLET ORAL DAILY
COMMUNITY
End: 2019-10-21

## 2019-07-26 RX ORDER — CLOPIDOGREL BISULFATE 75 MG/1
75 TABLET ORAL DAILY
Status: DISCONTINUED | OUTPATIENT
Start: 2019-07-26 | End: 2019-07-30 | Stop reason: HOSPADM

## 2019-07-26 RX ORDER — HEPARIN SODIUM 1000 [USP'U]/ML
3400 INJECTION, SOLUTION INTRAVENOUS; SUBCUTANEOUS ONCE
Status: COMPLETED | OUTPATIENT
Start: 2019-07-26 | End: 2019-07-26

## 2019-07-26 RX ORDER — HEPARIN SODIUM 1000 [USP'U]/ML
60 INJECTION, SOLUTION INTRAVENOUS; SUBCUTANEOUS PRN
Status: DISCONTINUED | OUTPATIENT
Start: 2019-07-26 | End: 2019-07-26 | Stop reason: SDUPTHER

## 2019-07-26 RX ORDER — HEPARIN SODIUM 10000 [USP'U]/100ML
12 INJECTION, SOLUTION INTRAVENOUS CONTINUOUS
Status: DISCONTINUED | OUTPATIENT
Start: 2019-07-26 | End: 2019-07-26 | Stop reason: SDUPTHER

## 2019-07-26 RX ORDER — CALCIUM GLUCONATE 94 MG/ML
1 INJECTION, SOLUTION INTRAVENOUS ONCE
Status: COMPLETED | OUTPATIENT
Start: 2019-07-26 | End: 2019-07-26

## 2019-07-26 RX ORDER — SODIUM CHLORIDE 0.9 % (FLUSH) 0.9 %
10 SYRINGE (ML) INJECTION PRN
Status: DISCONTINUED | OUTPATIENT
Start: 2019-07-26 | End: 2019-07-30 | Stop reason: HOSPADM

## 2019-07-26 RX ORDER — LEVOTHYROXINE SODIUM 0.05 MG/1
1 TABLET ORAL DAILY
Status: DISCONTINUED | OUTPATIENT
Start: 2019-07-26 | End: 2019-07-26

## 2019-07-26 RX ORDER — FUROSEMIDE 20 MG/1
20 TABLET ORAL 2 TIMES DAILY
Status: ON HOLD | COMMUNITY
End: 2019-10-22 | Stop reason: HOSPADM

## 2019-07-26 RX ORDER — LEVOTHYROXINE SODIUM 0.1 MG/1
100 TABLET ORAL DAILY
Status: ON HOLD | COMMUNITY
End: 2019-10-22 | Stop reason: HOSPADM

## 2019-07-26 RX ORDER — LEVOTHYROXINE SODIUM 0.1 MG/1
100 TABLET ORAL DAILY
Status: DISCONTINUED | OUTPATIENT
Start: 2019-07-27 | End: 2019-07-30 | Stop reason: HOSPADM

## 2019-07-26 RX ORDER — HEPARIN SODIUM 10000 [USP'U]/100ML
6.9 INJECTION, SOLUTION INTRAVENOUS CONTINUOUS
Status: DISCONTINUED | OUTPATIENT
Start: 2019-07-26 | End: 2019-07-26 | Stop reason: ALTCHOICE

## 2019-07-26 RX ORDER — ATORVASTATIN CALCIUM 40 MG/1
40 TABLET, FILM COATED ORAL NIGHTLY
Status: DISCONTINUED | OUTPATIENT
Start: 2019-07-26 | End: 2019-07-26 | Stop reason: SDUPTHER

## 2019-07-26 RX ORDER — SODIUM CHLORIDE 0.9 % (FLUSH) 0.9 %
10 SYRINGE (ML) INJECTION EVERY 12 HOURS SCHEDULED
Status: DISCONTINUED | OUTPATIENT
Start: 2019-07-26 | End: 2019-07-30 | Stop reason: HOSPADM

## 2019-07-26 RX ORDER — LANOLIN ALCOHOL/MO/W.PET/CERES
3 CREAM (GRAM) TOPICAL NIGHTLY
COMMUNITY
End: 2019-10-21

## 2019-07-26 RX ORDER — PANTOPRAZOLE SODIUM 40 MG/1
40 GRANULE, DELAYED RELEASE ORAL
Status: DISCONTINUED | OUTPATIENT
Start: 2019-07-26 | End: 2019-07-30 | Stop reason: HOSPADM

## 2019-07-26 RX ORDER — PROMETHAZINE HYDROCHLORIDE 25 MG/1
25 SUPPOSITORY RECTAL EVERY 6 HOURS PRN
Status: ON HOLD | COMMUNITY
End: 2019-10-22 | Stop reason: HOSPADM

## 2019-07-26 RX ORDER — ONDANSETRON 2 MG/ML
4 INJECTION INTRAMUSCULAR; INTRAVENOUS EVERY 6 HOURS PRN
Status: DISCONTINUED | OUTPATIENT
Start: 2019-07-26 | End: 2019-07-30 | Stop reason: HOSPADM

## 2019-07-26 RX ORDER — FUROSEMIDE 10 MG/ML
20 INJECTION INTRAMUSCULAR; INTRAVENOUS 2 TIMES DAILY
Status: DISCONTINUED | OUTPATIENT
Start: 2019-07-26 | End: 2019-07-27

## 2019-07-26 RX ORDER — NITROGLYCERIN 0.4 MG/1
0.4 TABLET SUBLINGUAL EVERY 5 MIN PRN
Status: DISCONTINUED | OUTPATIENT
Start: 2019-07-26 | End: 2019-07-30 | Stop reason: HOSPADM

## 2019-07-26 RX ORDER — HEPARIN SODIUM 5000 [USP'U]/ML
5000 INJECTION, SOLUTION INTRAVENOUS; SUBCUTANEOUS EVERY 8 HOURS SCHEDULED
Status: DISCONTINUED | OUTPATIENT
Start: 2019-07-26 | End: 2019-07-30 | Stop reason: HOSPADM

## 2019-07-26 RX ORDER — ATORVASTATIN CALCIUM 40 MG/1
40 TABLET, FILM COATED ORAL DAILY
Status: DISCONTINUED | OUTPATIENT
Start: 2019-07-26 | End: 2019-07-30 | Stop reason: HOSPADM

## 2019-07-26 RX ADMIN — FUROSEMIDE 20 MG: 10 INJECTION, SOLUTION INTRAMUSCULAR; INTRAVENOUS at 17:18

## 2019-07-26 RX ADMIN — FUROSEMIDE 40 MG: 10 INJECTION, SOLUTION INTRAMUSCULAR; INTRAVENOUS at 04:53

## 2019-07-26 RX ADMIN — INSULIN HUMAN 10 UNITS: 100 INJECTION, SOLUTION PARENTERAL at 05:10

## 2019-07-26 RX ADMIN — HEPARIN SODIUM 3400 UNITS: 1000 INJECTION INTRAVENOUS; SUBCUTANEOUS at 05:47

## 2019-07-26 RX ADMIN — Medication 25 G: at 05:10

## 2019-07-26 RX ADMIN — HEPARIN SODIUM 5000 UNITS: 5000 INJECTION INTRAVENOUS; SUBCUTANEOUS at 17:18

## 2019-07-26 RX ADMIN — HEPARIN SODIUM AND DEXTROSE 6.9 ML/HR: 10000; 5 INJECTION INTRAVENOUS at 05:47

## 2019-07-26 RX ADMIN — CALCIUM GLUCONATE 1 G: 98 INJECTION, SOLUTION INTRAVENOUS at 05:03

## 2019-07-26 ASSESSMENT — PAIN SCALES - GENERAL
PAINLEVEL_OUTOF10: 0

## 2019-07-26 ASSESSMENT — ENCOUNTER SYMPTOMS
WHEEZING: 0
NAUSEA: 0
BACK PAIN: 0
PHOTOPHOBIA: 0
RHINORRHEA: 0
COUGH: 1
COLOR CHANGE: 0
SHORTNESS OF BREATH: 1
VOMITING: 0
CHEST TIGHTNESS: 0

## 2019-07-26 NOTE — PROGRESS NOTES
Medication Reconciliation    List of medications patient is currently taking is complete.      [facility]    John Callejas PharmD, BCPS  7/26/2019 1:04 PM

## 2019-07-26 NOTE — H&P
Provider, MD   atorvastatin (LIPITOR) 40 MG tablet Take 40 mg by mouth daily    Historical Provider, MD   Calcium Carbonate-Vitamin D (CALTRATE 600+D) 600-400 MG-UNIT CHEW Take by mouth    Historical Provider, MD   ferrous sulfate 325 (65 FE) MG tablet Take 325 mg by mouth daily (with breakfast)    Historical Provider, MD   pantoprazole sodium (PROTONIX) 40 MG PACK packet Take 40 mg by mouth every morning (before breakfast)    Historical Provider, MD   Cholecalciferol (VITAMIN D3) 2000 UNITS CAPS Take by mouth    Historical Provider, MD   lisinopril (PRINIVIL;ZESTRIL) 5 MG tablet TAKE 1 TABLET BY MOUTH DAILY 7/3/14   Jared Westfall MD   levothyroxine (SYNTHROID) 50 MCG tablet TAKE 1 TABLET BY MOUTH DAILY  Patient taking differently: 100 mcg  7/3/14   Oskar Zafar MD   isosorbide mononitrate (IMDUR) 30 MG CR tablet TAKE 1 TABLET BY MOUTH DAILY 5/9/14   Oskar Zafar MD   ammonium lactate (LAC-HYDRIN) 12 % lotion Apply  topically 3 times daily as needed for Dry Skin. Apply topically daily. 1/9/14   Jared Westfall MD   clopidogrel (PLAVIX) 75 MG tablet TAKE ONE TABLET BY MOUTH DAILY 12/18/13   Oskar Zafar MD   amLODIPine (NORVASC) 10 MG tablet TAKE 1 TABLET BY MOUTH EVERY DAY for blood pressure 11/4/13   Oskar Zafar MD   hydrochlorothiazide (HYDRODIURIL) 50 MG tablet TAKE 1 TABLET BY MOUTH DAILY 7/3/13   Jared Westfall MD   alendronate (FOSAMAX) 70 MG tablet TAKE 1 TABLET BY MOUTH ONCE A WEEK AS DIRECTED 7/3/13   Jared Westfall MD   albuterol (PROAIR HFA) 108 (90 BASE) MCG/ACT inhaler Inhale 2 puffs into the lungs 3 times daily as needed for Wheezing or Shortness of Breath. 2/10/12   Oskar Zafar MD   nitroGLYCERIN (NITROSTAT) 0.4 MG SL tablet Place 1 tablet under the tongue every 5 minutes as needed for Chest pain. 10/11/11   Jared Westfall MD       Allergies:  Ppd [tuberculin purified protein derivative]    Social History:  The patient currently lives at a nursing facility    TOBACCO:   reports that he has quit smoking.  He has quit using

## 2019-07-26 NOTE — PROGRESS NOTES
4 Eyes Skin Assessment     The patient is being assess for  Admission    I agree that 2 RN's have performed a thorough Head to Toe Skin Assessment on the patient. ALL assessment sites listed below have been assessed. Areas assessed by both nurses:   [x]   Head, Face, and Ears   [x]   Shoulders, Back, and Chest  [x]   Arms, Elbows, and Hands   [x]   Coccyx, Sacrum, and IschIum  [x]   Legs, Feet, and Heels        Does the Patient have Skin Breakdown?   No         Neftaly Prevention initiated:  No   Wound Care Orders initiated:  No      [facility] nurse consulted for Pressure Injury (Stage 3,4, Unstageable, DTI, NWPT, and Complex wounds), New and Established Ostomies:  No      Nurse 1 eSignature: Electronically signed by Heri Villalta RN on 7/26/19 at 7:45 AM    **SHARE this note so that the co-signing nurse is able to place an eSignature**    Nurse 2 eSignature: Electronically signed by Keanu Sandoval RN on 7/26/19 at 8:07 AM

## 2019-07-26 NOTE — PLAN OF CARE
Fall risk assessment completed every shift. All precautions in place. Pt has call light within reach at all times. Room clear of clutter. Pt aware to call for assistance when getting up. Bed locked in lowest position, alarm engaged, call light within reach, will continue to monitor. Skin assessment completed every shift. Pt assessed for incontinence, appropriate barrier cream applied prn. Pt encouraged to turn/rotate every 2 hours. Assistance provided if pt unable to do so themselves. Pain/discomfort being managed with PRN analgesics per MD orders. Pt able to express presence and absence of pain and rate pain appropriately using numerical scale. Patient's ability assessed to perform self care and independent activity encouraged according to that ability. Assisted with ADL's as needed. Risk for skin breakdown assessed. Potential discharge needs assessed. Patient and family included in daily care decisions. Vitals completed q4h and assessed by RN. I&O charted and assessed per MD order. Pt tolerating adequate fluid intake. Pt electrolytes drawn and assessed per MD order. Replaced as needed per orders. I&O charted and assessed.   Pt tolerating adequate fluid intake

## 2019-07-26 NOTE — PROGRESS NOTES
Clinical Pharmacy Note  Heparin Dosing Consult    Bina Hicks is a 80 y.o. male ordered heparin per low dose nomogram by Dr. Tami MONIQUE [facility] Lab Results   Component Value Date    APTT 26.8 07/26/2019     Lab Results   Component Value Date    HGB 10.9 07/26/2019    HCT 34.1 07/26/2019     07/26/2019    INR 1.16 07/26/2019       Ht Readings from Last 1 Encounters:   12/08/17 5' 5\" (1.651 m)        Wt Readings from Last 1 Encounters:   07/26/19 126 lb 5.2 oz (57.3 kg)     Dosing weight: 57.3 kg    Assessment/Plan:  Initial bolus: 3400 units  Initial infusion rate: 6.9 mL/hr  Next aPTT: 1200 7/26/19    Pharmacy will continue to monitor adjust heparin based on aPTT results using nomogram below:     LOW DOSE HEPARIN PROTOCOL (ACS/STEMI/A FIB)     Initial Bolus: 60 units/kg Max Bolus: 4,000 units       Initial Rate: 12 units/kg/hr Max Initial Rate: 1,000 units/hr     aPTT < 36   Heparin 60 units/kg bolus Increase infusion by 4 units/kg/hr       (maximum 4,000 units)   aPTT 37-53   Heparin 30 units/kg bolus Increase infusion by 2 units/kg/hr       (maximum 2,000 units)   aPTT 54-90   No bolus   No change   aPTT 91-98   No bolus   Decrease infusion by 2 units/kg/hr   aPTT    Hold heparin for 1 hour Decrease infusion by 3 units/kg/hr   aPTT 108-115  Hold heparin for 1 hour Decrease infusion by 4 units/kg/hr   aPTT > 116   Hold heparin for 1 hour Decrease infusion by 6 units/kg/hr    Obtain aPTT 6 hours after initial bolus and 6 hours after any dose change until two consecutive therapeutic aPTTs are achieved - then daily.

## 2019-07-26 NOTE — CONSULTS
[address]  Cardiology Consult Note        CC:     Elevated troponin           HPI:   This is a 80 y.o. male who actually came to the emergency room for evaluation of cough and shortness of breath from the nursing home. ER note states that the patient was very somnolent on initial presentation; when he woke up he became agitated and combative. He was refusing all blood work and did not answer questions      Past Medical History:   Diagnosis Date    Arthritis     Backache, unspecified     CHF (congestive heart failure) (HCC)     LVEF 40-45%    Coronary atherosclerosis of unspecified type of vessel, native or graft     Debility, unspecified     Dysphagia, oropharyngeal phase     Dysphagia, unspecified(787.20)     Esophageal reflux     H/O: CVA     left hemiparesis    Hemiplegia affecting unspecified side, late effect of cerebrovascular disease     Hyperlipidemia     Hypertension     Hypothyroid     Muscular wasting and disuse atrophy, not elsewhere classified     Nonspecific reaction to tuberculin skin test without active tuberculosis(795.51)     Osteoporosis NEC     Osteoporosis, unspecified     Other and unspecified hyperlipidemia     Other specified rehabilitation procedure(V57.89)     Personal history of surgery to other organs     Pneumonia     Sepsis(995.91)     Stricture and stenosis of esophagus     Type II or unspecified type diabetes mellitus without mention of complication, not stated as uncontrolled     Type II or unspecified type diabetes mellitus without mention of complication, not stated as uncontrolled     Unspecified hypothyroidism     Urinary tract infection, site not specified       Past Surgical History:   Procedure Laterality Date    CARDIAC CATHETERIZATION  8/11/15    HERNIA REPAIR      OTHER SURGICAL HISTORY  8/11/15    Loop recorder      History reviewed. No pertinent family history.    Social History     Tobacco Use    Smoking status: Former Smoker  Smokeless tobacco: Former User    Tobacco comment: 1991   Substance Use Topics    Alcohol use: No    Drug use: No     Allergies   Allergen Reactions    Ppd [Tuberculin Purified Protein Derivative] Other (See Comments)     Unknown listed on nursing home paperwork.       aspirin  325 mg Oral Once    atorvastatin  40 mg Oral Daily    clopidogrel  75 mg Oral Daily    pantoprazole sodium  40 mg Oral QAM AC    sodium chloride flush  10 mL Intravenous 2 times per day    [START ON 7/27/2019] aspirin  81 mg Oral Daily    metoprolol tartrate  25 mg Oral BID    sodium polystyrene  15 g Oral Once    [START ON 7/27/2019] levothyroxine  100 mcg Oral Daily       Intake/Output Summary (Last 24 hours) at 7/26/2019 1050  Last data filed at 7/26/2019 0650  Gross per 24 hour   Intake 0 ml   Output 0 ml   Net 0 ml       Physical Examination:  BP (!) 90/51   Pulse 74   Temp 97.5 °F (36.4 °C) (Axillary)   Resp 21   Ht 5' 5\" (1.651 m)   Wt 121 lb 14.6 oz (55.3 kg)   SpO2 94%   BMI 20.29 kg/m²        Current Facility-Administered Medications: glucose (GLUTOSE) 40 % oral gel 15 g, 15 g, Oral, PRN  dextrose 50 % IV solution, 12.5 g, Intravenous, PRN  glucagon (rDNA) injection 1 mg, 1 mg, Intramuscular, PRN  dextrose 5 % solution, 100 mL/hr, Intravenous, PRN  heparin 25,000 units in dextrose 5% 250 mL infusion, 6.9 mL/hr, Intravenous, Continuous  aspirin tablet 325 mg, 325 mg, Oral, Once  acetaminophen (TYLENOL) tablet 650 mg, 650 mg, Oral, Q6H PRN  albuterol sulfate  (90 Base) MCG/ACT inhaler 2 puff, 2 puff, Inhalation, TID PRN  atorvastatin (LIPITOR) tablet 40 mg, 40 mg, Oral, Daily  clopidogrel (PLAVIX) tablet 75 mg, 75 mg, Oral, Daily  nitroGLYCERIN (NITROSTAT) SL tablet 0.4 mg, 0.4 mg, Sublingual, Q5 Min PRN  pantoprazole sodium (PROTONIX) packet 40 mg, 40 mg, Oral, QAM AC  sodium chloride flush 0.9 % injection 10 mL, 10 mL, Intravenous, 2 times per day  sodium chloride flush 0.9 % injection 10 mL, 10 mL,

## 2019-07-26 NOTE — PROGRESS NOTES
Upon arrival to room, pt had taken off NC. RN checked 02 d/t pt's statement that he does not require 02 at [facility]. Pt without NC was 78% RA. RN educated need for supplemental oxygen and placed back on pt. Pt took off immediately saying \"I don't need this. I don't want this\". RN educated again pt. Pt allowed RN to replace NC. MD aware. RN frequently checking on pt to assure 02 placed properly. Pt currently on 5L NC with 02 sat of 92%.  Electronically signed by Odalis Hopper RN on 7/26/2019 at 1:31 PM

## 2019-07-26 NOTE — ED PROVIDER NOTES
[facility]  eMERGENCY dEPARTMENTeNCOUnter      Pt Name: Marisol Martínez  MRN: [id number]  [place] 12/20/1932  Date ofevaluation: 7/26/2019  Provider: Laverne Harrison MD    CHIEF COMPLAINT       Chief Complaint   Patient presents with    Shortness of Breath     started with non productive cough at nursing home, then having SOB, patient requested to be sent out for evaluation. HISTORY OF PRESENT ILLNESS   (Location/Symptom, Timing/Onset,Context/Setting, Quality, Duration, Modifying Factors, Severity)  Note limiting factors. Marisol Martínez is a 80 y.o. male who presents to the emergency department for evaluation of cough and shortness of breath. Patient is coming from a nursing home and they states the patient has been having a cough and was complaining of shortness of breath and requested to be sent into the ED for evaluation. On presentation the patient is very somnolent. When he tried to wake the patient he becomes agitated and combative. Initially the patient was refusing blood work and would not answer questions. Patient states that he just wants to sleep. Patient denies chest pains abdominal pain nausea or vomiting. Patient denies fevers. The patient is a DNR CC. There is no POA present. HPI    NursingNotes were reviewed. REVIEW OF SYSTEMS    (2-9 systems for level 4, 10 or more for level 5)     Review of Systems   Constitutional: Negative for activity change, chills and fever. HENT: Negative for congestion and rhinorrhea. Eyes: Negative for photophobia and visual disturbance. Respiratory: Positive for cough and shortness of breath. Negative for chest tightness and wheezing. Cardiovascular: Negative for chest pain, palpitations and leg swelling. Gastrointestinal: Negative for nausea and vomiting. Endocrine: Negative for polydipsia and polyuria. Genitourinary: Negative for difficulty urinating and frequency.    Musculoskeletal: Negative (PROAIR HFA) 108 (90 BASE) MCG/ACT INHALER    Inhale 2 puffs into the lungs 3 times daily as needed for Wheezing or Shortness of Breath. ALENDRONATE (FOSAMAX) 70 MG TABLET    TAKE 1 TABLET BY MOUTH ONCE A WEEK AS DIRECTED    AMLODIPINE (NORVASC) 10 MG TABLET    TAKE 1 TABLET BY MOUTH EVERY DAY for blood pressure    AMMONIUM LACTATE (LAC-HYDRIN) 12 % LOTION    Apply  topically 3 times daily as needed for Dry Skin. Apply topically daily. ATORVASTATIN (LIPITOR) 40 MG TABLET    Take 40 mg by mouth daily    CALCIUM CARBONATE-VITAMIN D (CALTRATE 600+D) 600-400 MG-UNIT CHEW    Take by mouth    CHOLECALCIFEROL (VITAMIN D3) 2000 UNITS CAPS    Take by mouth    CLOPIDOGREL (PLAVIX) 75 MG TABLET    TAKE ONE TABLET BY MOUTH DAILY    FERROUS SULFATE 325 (65 FE) MG TABLET    Take 325 mg by mouth daily (with breakfast)    GLIMEPIRIDE (AMARYL) 1 MG TABLET    Take 1 mg by mouth every morning (before breakfast)    GUAIFENESIN-DEXTROMETHORPHAN (ROBITUSSIN DM) 100-10 MG/5ML SYRUP    Take 5 mLs by mouth 3 times daily as needed for Cough    HYDROCHLOROTHIAZIDE (HYDRODIURIL) 50 MG TABLET    TAKE 1 TABLET BY MOUTH DAILY    ISOSORBIDE MONONITRATE (IMDUR) 30 MG CR TABLET    TAKE 1 TABLET BY MOUTH DAILY    LEVOTHYROXINE (SYNTHROID) 50 MCG TABLET    TAKE 1 TABLET BY MOUTH DAILY    LISINOPRIL (PRINIVIL;ZESTRIL) 5 MG TABLET    TAKE 1 TABLET BY MOUTH DAILY    NITROGLYCERIN (NITROSTAT) 0.4 MG SL TABLET    Place 1 tablet under the tongue every 5 minutes as needed for Chest pain. PANTOPRAZOLE SODIUM (PROTONIX) 40 MG PACK PACKET    Take 40 mg by mouth every morning (before breakfast)    POTASSIUM CHLORIDE (K-DUR) 10 MEQ TABLET    Take 1 tablet by mouth daily (with breakfast) for 7 days. ALLERGIES     Ppd [tuberculin purified protein derivative]    FAMILY HISTORY     History reviewed. No pertinent family history.        SOCIAL HISTORY       Social History     Socioeconomic History    Marital status: Single     Spouse name: None    Laboratory  [address] [place], [place] [address]   Phone [phone]   POCT GLUCOSE - Abnormal; Notable for the following components:    POC Glucose 306 (*)     All other components within normal limits    Narrative:     Performed at:  [facility]  [address] [place], [place] [address]   Phone [phone]   CULTURE BLOOD #1   CULTURE BLOOD #2   LACTIC ACID, PLASMA    Narrative:     Performed at:  [facility]  [address] [facility] [address]   Phone [phone]   APTT    Narrative:     Performed at:  [facility]  [address] [facility] [address]   Phone [phone]   APTT   POCT GLUCOSE   POCT GLUCOSE   POCT GLUCOSE   POCT GLUCOSE       All other labs were within normal range or not returned as of this dictation. EMERGENCY DEPARTMENT COURSE and DIFFERENTIAL DIAGNOSIS/MDM:   Vitals:    Vitals:    07/26/19 0230 07/26/19 0414 07/26/19 0430 07/26/19 0517   BP: 101/63 101/63 (!) 108/59 (!) 102/51   Pulse: 79 74 75 78   Resp: (!) 37 26 (!) 36 16   Temp:       TempSrc:       SpO2: 94% 93% 94%    Weight:               MDM  Number of Diagnoses or Management Options  Diagnosis management comments: 10year-old male presents the ED for evaluation of cough and shortness of breath. On presentation patient is satting well on supplemental oxygen. Vital signs are within normal limits. Patient very combative and agitated. Initially the patient was refusing blood work but eventually agreed. Patient was denying chest pains or abdominal pains. Obtain basic laboratory work-up and chest x-ray. Differential diagnosis includes ACS, pneumonia, CHF exacerbation, PE, bronchitis, viral syndrome, GERD, and pneumothorax.         Amount and/or Complexity of Data Reviewed  Decide to obtain previous medical records or to obtain history from someone other than the patient: yes          REASSESSMENT ED Course as of Jul 26 0552   Fri Jul 26, 2019   0630 On attempt was made to repeat EKG after patient's troponin resulted slightly elevated and the patient refused. Despite multiple attempts to convince the patient and informing him that he is having a heart attack he still refused EKG . [CS]      ED Course User Index  [CS] Adelso Perales MD     Patient's work-up remarkable for an elevated troponin of 0.99. Initial EKG was nondiagnostic for ACS and with normal troponin resulted repeat EKG was obtained which again was nondiagnostic for ACS. Patient appears to be having a non-STEMI. Patient's work-up also remarkable for hyperkalemia and ANDREINA. Patient's BNP was elevated. Chest x-ray demonstrated pulmonary edema. Patient was started on calcium gluconate, insulin, and Lasix for treatment of hyperkalemia. Patient also started on heparin for treatment of an STEMI. Patient will be admitted the hospital for further medical management evaluation. CRITICAL CARE TIME   Total Critical Care time was 45 minutes, excluding separatelyreportable procedures. There was a high probability ofclinically significant/life threatening deterioration in the patient's condition which required my urgent intervention. CONSULTS:  IP CONSULT TO PALLIATIVE CARE  IP CONSULT TO CARDIOLOGY    PROCEDURES:  Unless otherwise noted below, none     Procedures    FINAL IMPRESSION      1. Shortness of breath    2. Congestive heart failure, unspecified HF chronicity, unspecified heart failure type ([address]    3. Hyperkalemia    4. NSTEMI (non-ST elevated myocardial infarction) ([facility]    5.  ANDREINA (acute kidney injury) [facility])          DISPOSITION/PLAN   DISPOSITION Admitted 07/26/2019 05:07:12 AM      PATIENT REFERREDTO:  Sonia Santos MD  [facility]            DISCHARGEMEDICATIONS:  New Prescriptions    No medications on file          (Please note that portions of this note were completed

## 2019-07-26 NOTE — CARE COORDINATION
Dr. Ilir Boss asked if patient has POA, as patient apparently does not have family/next of kin and patient refusing o2 and treatment and stated that he wanted to die. Per Anne Marie Beatty at [facility], patient does not have POA, has been able to make his own decisions. Dr Ilir Boss has not been able to speak with patient to determine if patient is currently able to make his own decisions as he has been refusing to speak to staff. He stated that he will attempt again to speak to patient to determine if competent or not. If he is, then will likely move forward with hospice if patient chooses to do so. If not, will need to take steps to determine who can make decisions for patient. Per RN, patient's emergency contact is Ben Groves who is a friend. He is out of town and has not answered his phone.      Milvia Ng, [address] [facility]  [phone]  7/26/19 at 4:52 PM
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
## 2019-07-27 LAB
EKG ATRIAL RATE: 55 BPM
EKG DIAGNOSIS: NORMAL
EKG Q-T INTERVAL: 406 MS
EKG QRS DURATION: 130 MS
EKG QTC CALCULATION (BAZETT): 441 MS
EKG R AXIS: -50 DEGREES
EKG T AXIS: -46 DEGREES
EKG VENTRICULAR RATE: 71 BPM
GLUCOSE BLD-MCNC: 114 MG/DL (ref 70–99)
GLUCOSE BLD-MCNC: 137 MG/DL (ref 70–99)
GLUCOSE BLD-MCNC: 155 MG/DL (ref 70–99)
HCT VFR BLD CALC: 35.2 % (ref 40.5–52.5)
HEMOGLOBIN: 11.2 G/DL (ref 13.5–17.5)
MCH RBC QN AUTO: 28.7 PG (ref 26–34)
MCHC RBC AUTO-ENTMCNC: 31.9 G/DL (ref 31–36)
MCV RBC AUTO: 90 FL (ref 80–100)
PDW BLD-RTO: 17.2 % (ref 12.4–15.4)
PERFORMED ON: ABNORMAL
PLATELET # BLD: 284 K/UL (ref 135–450)
PMV BLD AUTO: 7.9 FL (ref 5–10.5)
RBC # BLD: 3.92 M/UL (ref 4.2–5.9)
WBC # BLD: 7.7 K/UL (ref 4–11)

## 2019-07-27 PROCEDURE — 6370000000 HC RX 637 (ALT 250 FOR IP): Performed by: INTERNAL MEDICINE

## 2019-07-27 PROCEDURE — 97530 THERAPEUTIC ACTIVITIES: CPT

## 2019-07-27 PROCEDURE — 97167 OT EVAL HIGH COMPLEX 60 MIN: CPT

## 2019-07-27 PROCEDURE — 93005 ELECTROCARDIOGRAM TRACING: CPT | Performed by: INTERNAL MEDICINE

## 2019-07-27 PROCEDURE — 93010 ELECTROCARDIOGRAM REPORT: CPT | Performed by: INTERNAL MEDICINE

## 2019-07-27 PROCEDURE — 36415 COLL VENOUS BLD VENIPUNCTURE: CPT

## 2019-07-27 PROCEDURE — 85027 COMPLETE CBC AUTOMATED: CPT

## 2019-07-27 PROCEDURE — 2060000000 HC ICU INTERMEDIATE R&B

## 2019-07-27 PROCEDURE — 2580000003 HC RX 258: Performed by: INTERNAL MEDICINE

## 2019-07-27 PROCEDURE — 97163 PT EVAL HIGH COMPLEX 45 MIN: CPT

## 2019-07-27 RX ORDER — FUROSEMIDE 20 MG/1
20 TABLET ORAL 2 TIMES DAILY
Status: DISCONTINUED | OUTPATIENT
Start: 2019-07-27 | End: 2019-07-30 | Stop reason: HOSPADM

## 2019-07-27 RX ADMIN — METOPROLOL TARTRATE 25 MG: 25 TABLET ORAL at 11:29

## 2019-07-27 RX ADMIN — LEVOTHYROXINE SODIUM 100 MCG: 100 TABLET ORAL at 11:30

## 2019-07-27 RX ADMIN — CLOPIDOGREL BISULFATE 75 MG: 75 TABLET ORAL at 11:30

## 2019-07-27 RX ADMIN — ATORVASTATIN CALCIUM 40 MG: 40 TABLET, FILM COATED ORAL at 11:30

## 2019-07-27 RX ADMIN — ASPIRIN 81 MG 81 MG: 81 TABLET ORAL at 11:30

## 2019-07-27 ASSESSMENT — PAIN SCALES - GENERAL
PAINLEVEL_OUTOF10: 0

## 2019-07-27 NOTE — PROGRESS NOTES
from [facility] over phone who reports he was ambulatory with a RW until about one month ago when he received a new roommate and has been using a wheelchair since. He was reportedly SBA for ADLs. Objective  Strength RLE  Strength RLE: Exception  Comment: unable to formally assess due to cogniton, appears generalized moderate weakness  Strength LLE  Strength LLE: Exception  Comment: unable to formally assess due to cogniton, appears generalized moderate weakness        Bed mobility  Supine to Sit: Supervision  Scooting: Supervision  Transfers  Sit to Stand: 2 Person Assistance;Minimal Assistance;Maximum Assistance  Stand to sit: 2 Person Assistance;Minimal Assistance;Maximum Assistance  Comment: Mick was able to stand from the EOB with min A of 2 person at the stedy. He required max A of 2 person to stand from toilet with stedy  Ambulation  Ambulation?: No     Balance  Posture: Poor  Sitting - Static: Fair  Sitting - Dynamic: Fair  Standing - Static: Poor;+        Plan   Plan  Times per week: 2-3x/week  Current Treatment Recommendations: Functional Mobility Training  Safety Devices  Type of devices:  All fall risk precautions in place, Call light within reach, Chair alarm in place, Gait belt, Patient at risk for falls, Left in chair, Nurse notified           Goals  Short term goals  Time Frame for Short term goals: by acute discharge  Short term goal 1: sit<>stand with min A of one person  Short term goal 2: assess ambulation as appropriate  Patient Goals   Patient goals : unable to assess       Therapy Time   Individual Concurrent Group Co-treatment   Time In  0830         Time Out  0910         Minutes  40                 [place], [place]

## 2019-07-27 NOTE — PROGRESS NOTES
at 7/27/2019 0858  Gross per 24 hour   Intake 120 ml   Output 0 ml   Net 120 ml       Exam:    /70   Pulse 80   Temp 97.8 °F (36.6 °C) (Axillary)   Resp 14   Ht 5' 5\" (1.651 m)   Wt 121 lb 14.6 oz (55.3 kg)   SpO2 91%   BMI 20.29 kg/m²     General appearance: Chronically ill appearing, cachectic, no apparent distress appears older than stated age. HEENT Normal cephalic, atraumatic without obvious deformity. Pupils equal, round, and reactive to light. Extra ocular muscles intact. Conjunctivae/corneas clear. Neck: Supple, No jugular venous distention/bruits. Trachea midline without thyromegaly or adenopathy with full range of motion. Lungs: Increased work of breathing, accessory muscle use, bibasilar crackles. Heart: Regular rate and rhythm with Normal S1/S2 without murmurs, rubs or gallops, point of maximum impulse non-displaced  Abdomen: Soft, non-tender or non-distended without rigidity or guarding and positive bowel sounds all four quadrants. Extremities: No clubbing, cyanosis, or edema bilaterally. Full range of motion without deformity and normal gait intact. Skin: Skin color, texture, turgor normal.  No rashes or lesions. Neurologic: Alert and oriented X 3, neurovascularly intact with sensory/motor intact upper extremities/lower extremities, bilaterally. Cranial nerves: II-XII intact, grossly non-focal.  Mental status: Fatigued, not oriented.   Capillary Refill: Acceptable  < 3 seconds  Peripheral Pulses: +3 Easily felt, not easily obliterated with pressure      Labs:   Recent Labs     07/26/19 0335 07/26/19  0846 07/27/19  0425   WBC 10.0 10.2 7.7   HGB 10.9* 10.8* 11.2*   HCT 34.1* 33.9* 35.2*    264 284     Recent Labs     07/26/19 0335      K 6.1*      CO2 20*   BUN 69*   CREATININE 1.8*   CALCIUM 9.5     Recent Labs     07/26/19 0335   AST 26   ALT 11   BILITOT 0.7   ALKPHOS 84     Recent Labs     07/26/19 0335   INR 1.16*     Recent Labs     07/26/19 0335

## 2019-07-28 ENCOUNTER — APPOINTMENT (OUTPATIENT)
Dept: GENERAL RADIOLOGY | Age: 84
DRG: 280 | End: 2019-07-28
Payer: MEDICARE

## 2019-07-28 LAB
A/G RATIO: 1 (ref 1.1–2.2)
ALBUMIN SERPL-MCNC: 3.8 G/DL (ref 3.4–5)
ALP BLD-CCNC: 80 U/L (ref 40–129)
ALT SERPL-CCNC: 12 U/L (ref 10–40)
ANION GAP SERPL CALCULATED.3IONS-SCNC: 16 MMOL/L (ref 3–16)
AST SERPL-CCNC: 25 U/L (ref 15–37)
BILIRUB SERPL-MCNC: 0.9 MG/DL (ref 0–1)
BILIRUBIN URINE: NEGATIVE
BLOOD, URINE: ABNORMAL
BUN BLDV-MCNC: 74 MG/DL (ref 7–20)
CALCIUM SERPL-MCNC: 9.6 MG/DL (ref 8.3–10.6)
CHLORIDE BLD-SCNC: 108 MMOL/L (ref 99–110)
CLARITY: ABNORMAL
CO2: 22 MMOL/L (ref 21–32)
COLOR: YELLOW
CREAT SERPL-MCNC: 1.9 MG/DL (ref 0.8–1.3)
EPITHELIAL CELLS, UA: 6 /HPF (ref 0–5)
GFR AFRICAN AMERICAN: 41
GFR NON-AFRICAN AMERICAN: 34
GLOBULIN: 3.8 G/DL
GLUCOSE BLD-MCNC: 135 MG/DL (ref 70–99)
GLUCOSE BLD-MCNC: 177 MG/DL (ref 70–99)
GLUCOSE BLD-MCNC: 179 MG/DL (ref 70–99)
GLUCOSE URINE: NEGATIVE MG/DL
HYALINE CASTS: 11 /LPF (ref 0–8)
KETONES, URINE: NEGATIVE MG/DL
LEUKOCYTE ESTERASE, URINE: ABNORMAL
MICROSCOPIC EXAMINATION: YES
NITRITE, URINE: NEGATIVE
PERFORMED ON: ABNORMAL
PERFORMED ON: ABNORMAL
PH UA: 5 (ref 5–8)
POTASSIUM REFLEX MAGNESIUM: 4.5 MMOL/L (ref 3.5–5.1)
PROTEIN UA: NEGATIVE MG/DL
RBC UA: 2 /HPF (ref 0–4)
SODIUM BLD-SCNC: 146 MMOL/L (ref 136–145)
SPECIFIC GRAVITY UA: 1.01 (ref 1–1.03)
TOTAL PROTEIN: 7.6 G/DL (ref 6.4–8.2)
UROBILINOGEN, URINE: 1 E.U./DL
WBC UA: 4 /HPF (ref 0–5)

## 2019-07-28 PROCEDURE — 97127 HC SP THER IVNTJ W/FOCUS COG FUNCJ: CPT

## 2019-07-28 PROCEDURE — 99221 1ST HOSP IP/OBS SF/LOW 40: CPT | Performed by: PSYCHIATRY & NEUROLOGY

## 2019-07-28 PROCEDURE — 6360000002 HC RX W HCPCS: Performed by: INTERNAL MEDICINE

## 2019-07-28 PROCEDURE — 71045 X-RAY EXAM CHEST 1 VIEW: CPT

## 2019-07-28 PROCEDURE — 2060000000 HC ICU INTERMEDIATE R&B

## 2019-07-28 PROCEDURE — 80053 COMPREHEN METABOLIC PANEL: CPT

## 2019-07-28 PROCEDURE — 6370000000 HC RX 637 (ALT 250 FOR IP): Performed by: INTERNAL MEDICINE

## 2019-07-28 PROCEDURE — 36415 COLL VENOUS BLD VENIPUNCTURE: CPT

## 2019-07-28 PROCEDURE — 92610 EVALUATE SWALLOWING FUNCTION: CPT

## 2019-07-28 PROCEDURE — 81001 URINALYSIS AUTO W/SCOPE: CPT

## 2019-07-28 RX ADMIN — LEVOTHYROXINE SODIUM 100 MCG: 100 TABLET ORAL at 09:09

## 2019-07-28 RX ADMIN — ATORVASTATIN CALCIUM 40 MG: 40 TABLET, FILM COATED ORAL at 09:09

## 2019-07-28 RX ADMIN — ASPIRIN 81 MG 81 MG: 81 TABLET ORAL at 09:09

## 2019-07-28 RX ADMIN — METOPROLOL TARTRATE 25 MG: 25 TABLET ORAL at 09:09

## 2019-07-28 RX ADMIN — HEPARIN SODIUM 5000 UNITS: 5000 INJECTION INTRAVENOUS; SUBCUTANEOUS at 20:07

## 2019-07-28 RX ADMIN — METOPROLOL TARTRATE 25 MG: 25 TABLET ORAL at 20:06

## 2019-07-28 RX ADMIN — FUROSEMIDE 20 MG: 20 TABLET ORAL at 17:31

## 2019-07-28 RX ADMIN — CLOPIDOGREL BISULFATE 75 MG: 75 TABLET ORAL at 09:09

## 2019-07-28 RX ADMIN — HEPARIN SODIUM 5000 UNITS: 5000 INJECTION INTRAVENOUS; SUBCUTANEOUS at 15:30

## 2019-07-28 RX ADMIN — FUROSEMIDE 20 MG: 20 TABLET ORAL at 09:16

## 2019-07-28 ASSESSMENT — PAIN SCALES - GENERAL
PAINLEVEL_OUTOF10: 0
PAINLEVEL_OUTOF10: 0

## 2019-07-28 NOTE — PLAN OF CARE
to distinguish between reality-based and nonreality-based thinking  7/28/2019 1541 by Kenya Morales RN  Outcome: Ongoing  7/28/2019 1541 by Kenya Morales RN  Outcome: Ongoing  Goal: Able to interrupt nonreality-based thinking  Description  Able to interrupt nonreality-based thinking  7/28/2019 1541 by Kenya Morales RN  Outcome: Ongoing  7/28/2019 1541 by Kenya Morales RN  Outcome: Ongoing     Problem: Sleep Pattern Disturbance:  Goal: Appears well-rested  Description  Appears well-rested  7/28/2019 1541 by Kenya Morales RN  Outcome: Ongoing  7/28/2019 1541 by Kenya Morales RN  Outcome: Ongoing   Skin and tony assessment completed every shift. Appropriate interventions applied to prevent skin breakdown- Low air-loss mattress in use, heel protectors in use, scheduled turns, and patient is encouraged to turn as often as possible. Patient assessed for incontinence and barrier creams are applied prn. Protective borders in use. Patient's ability assessed to perform self care and independent activity encouraged according to that ability. Assisted with ADL's as needed. Risk for skin breakdown assessed. Potential discharge needs assessed. Patient and family included in daily care decisions. Pain/discomfort being managed with PRN analgesics per MD orders. Pt able to express presence and absence of pain and rate pain appropriately using numerical scale. Fall risk assessment completed every shift. All precautions in place- bed in lowest position with wheels locked, bed alarm in place and activated, non-skid socks on pt, fall risk ID on pt, call dont fall sign posted. Pt has call light within reach at all times. Room clear of clutter. Telesitter in room. Pt made aware to call for assistance when getting up. Will continue to monitor. Patient is combative at times but has been generally more agreeable to care today.

## 2019-07-28 NOTE — PROGRESS NOTES
inconsistent. He does know he's in the hospital because of his heart, and said he wants to go home, but it's unclear if he fully understands everything that is happening. I have asked psychiatry to help assess for medical competence. Determination of disposition will depend on his ability to make his own medical decisions. Unfortunately, there is no next of kin/POA making disposition difficult. Will continue current medical management in the meantime. Palliative care/hospice care is probably the most reasonable at this time, but cannot make referral unless the patient is deemed competent to make that decision.     Ismael Nicholas MD

## 2019-07-28 NOTE — CONSULTS
nebulizer solution Inhale 1 vial into the lungs every 4 hours as needed for Shortness of Breath or Other       melatonin 3 MG TABS tablet Take 3 mg by mouth nightly       hydrALAZINE (APRESOLINE) 10 MG tablet Take 10 mg by mouth 3 times daily      furosemide (LASIX) 20 MG tablet Take 20 mg by mouth 2 times daily      Hypromellose (NATURAL BALANCE TEARS OP) Apply to eye      levothyroxine (SYNTHROID) 100 MCG tablet Take 100 mcg by mouth Daily      acetaminophen (TYLENOL) 325 MG tablet Take 650 mg by mouth every 6 hours as needed for Pain      atorvastatin (LIPITOR) 40 MG tablet Take 40 mg by mouth daily      Calcium Carbonate-Vitamin D (CALTRATE 600+D) 600-400 MG-UNIT CHEW Take 1 tablet by mouth 2 times daily       ferrous sulfate 325 (65 FE) MG tablet Take 325 mg by mouth daily (with breakfast)      pantoprazole sodium (PROTONIX) 40 MG PACK packet Take 40 mg by mouth every morning (before breakfast)      Cholecalciferol (VITAMIN D3) 2000 UNITS CAPS Take 2,000 Units by mouth daily       lisinopril (PRINIVIL;ZESTRIL) 5 MG tablet TAKE 1 TABLET BY MOUTH DAILY 90 tablet 3    isosorbide mononitrate (IMDUR) 30 MG CR tablet TAKE 1 TABLET BY MOUTH DAILY 90 tablet 5    ammonium lactate (LAC-HYDRIN) 12 % lotion Apply  topically 3 times daily as needed for Dry Skin. Apply topically daily. 1 Bottle 3    amLODIPine (NORVASC) 10 MG tablet TAKE 1 TABLET BY MOUTH EVERY DAY for blood pressure 30 tablet 12    albuterol (PROAIR HFA) 108 (90 BASE) MCG/ACT inhaler Inhale 2 puffs into the lungs 3 times daily as needed for Wheezing or Shortness of Breath. 1 Inhaler 11    nitroGLYCERIN (NITROSTAT) 0.4 MG SL tablet Place 1 tablet under the tongue every 5 minutes as needed for Chest pain.  25 tablet 3    alendronate (FOSAMAX) 70 MG tablet TAKE 1 TABLET BY MOUTH ONCE A WEEK AS DIRECTED 12 tablet 3       Medications:  Scheduled Meds:   furosemide  20 mg Oral BID    aspirin  325 mg Oral Once    atorvastatin  40 mg Oral Daily

## 2019-07-28 NOTE — PROGRESS NOTES
Speech Language Pathology  Facility/Department: [facility] 5W PROGRESSIVE CARE   CLINICAL BEDSIDE SWALLOW EVALUATION    NAME: Gustavo Krabbe  : 1932  MRN: [id number]    ADMISSION DATE: 2019  ADMITTING DIAGNOSIS: has Other and unspecified hyperlipidemia; Essential hypertension, benign; Diabetes mellitus, type II ([facility] [address] History of CVA (cerebrovascular accident); Arthritis of hand; Hospital acquired PNA; Sepsis due to urinary tract infection ([facility] [address] Syncope; Chest pain; Syncope and collapse; Coronary artery disease involving native coronary artery without angina pectoris; Encounter for electronic analysis of reveal event recorder; Paroxysmal atrial fibrillation [facility]); NSTEM[facility] (non-ST elevated [facility] infarction) [facility] Neurocognitive disorder; and Delirium on their problem list.  ONSET DATE: 2019       Impression   Moderate CHF.         Recent Chest Xray/CT of Chest: 2019:      Date of Eval: 2019  Evaluating Therapist: Divine Ventura    Current Diet level:  Current Diet : NPO  Current Liquid Diet : Clear      Primary Complaint  Patient Complaint: Pt did not give a dysphagia complaint. Pain:  Pain Assessment  Pain Assessment: 0-10  Pain Level: 0  Patient's Stated Pain Goal: No pain    Reason for Referral  Gustavo Krabbe was referred for a bedside swallow evaluation to assess the efficiency of his swallow function, identify signs and symptoms of aspiration and make recommendations regarding safe dietary consistencies, effective compensatory strategies, and safe eating environment. Impression  Dysphagia Diagnosis: Mild to moderate oral stage dysphagia;Mild to moderate pharyngeal stage dysphagia  Dysphagia Impression : The sample size was small for this eval 2/2 pt refusal.  Orally, the pt had mild-mod deficits with reduced lingual coordination for bolus formation and posterior transit of the bolus.   The oral phase was delayed and as a result, premature bolus loss was occasionally

## 2019-07-28 NOTE — PLAN OF CARE
thinking  7/27/2019 2316 by Inga Boast, RN  Outcome: Ongoing  7/27/2019 0953 by Misty Lopez RN  Outcome: Ongoing  Goal: Able to interrupt nonreality-based thinking  Description  Able to interrupt nonreality-based thinking  7/27/2019 2316 by Inga Boast, RN  Outcome: Ongoing  7/27/2019 0953 by Misty Lopez RN  Outcome: Ongoing     Problem: Sleep Pattern Disturbance:  Goal: Appears well-rested  Description  Appears well-rested  7/27/2019 2316 by Inga Boast, RN  Outcome: Ongoing  7/27/2019 0953 by Misty Lopez RN  Outcome: Ongoing

## 2019-07-28 NOTE — PROGRESS NOTES
Nurse called to room by telesitter. On entering room, patient was sitting on side of bed stating he, \"was going to pee on the floor. \" Nurse offered urinal to patient, which he pushed away. Nurse asked patient to lay back down and patient started to become agitated and verbally aggressive. Patient began cussing and pushing at staff. More nurses to room to assist patient back into the bed. Patient in bed, with bed alarm on and telecamera in room.

## 2019-07-29 LAB
GLUCOSE BLD-MCNC: 121 MG/DL (ref 70–99)
GLUCOSE BLD-MCNC: 124 MG/DL (ref 70–99)
PERFORMED ON: ABNORMAL
PERFORMED ON: ABNORMAL

## 2019-07-29 PROCEDURE — 6370000000 HC RX 637 (ALT 250 FOR IP): Performed by: INTERNAL MEDICINE

## 2019-07-29 PROCEDURE — 97530 THERAPEUTIC ACTIVITIES: CPT | Performed by: PHYSICAL THERAPIST

## 2019-07-29 PROCEDURE — 6360000002 HC RX W HCPCS: Performed by: INTERNAL MEDICINE

## 2019-07-29 PROCEDURE — 97535 SELF CARE MNGMENT TRAINING: CPT

## 2019-07-29 PROCEDURE — 97530 THERAPEUTIC ACTIVITIES: CPT

## 2019-07-29 PROCEDURE — 2060000000 HC ICU INTERMEDIATE R&B

## 2019-07-29 PROCEDURE — 94760 N-INVAS EAR/PLS OXIMETRY 1: CPT

## 2019-07-29 RX ADMIN — HEPARIN SODIUM 5000 UNITS: 5000 INJECTION INTRAVENOUS; SUBCUTANEOUS at 20:47

## 2019-07-29 RX ADMIN — HEPARIN SODIUM 5000 UNITS: 5000 INJECTION INTRAVENOUS; SUBCUTANEOUS at 15:13

## 2019-07-29 RX ADMIN — ATORVASTATIN CALCIUM 40 MG: 40 TABLET, FILM COATED ORAL at 09:22

## 2019-07-29 RX ADMIN — HEPARIN SODIUM 5000 UNITS: 5000 INJECTION INTRAVENOUS; SUBCUTANEOUS at 05:09

## 2019-07-29 RX ADMIN — FUROSEMIDE 20 MG: 20 TABLET ORAL at 09:23

## 2019-07-29 RX ADMIN — ASPIRIN 81 MG 81 MG: 81 TABLET ORAL at 09:23

## 2019-07-29 RX ADMIN — CLOPIDOGREL BISULFATE 75 MG: 75 TABLET ORAL at 09:22

## 2019-07-29 RX ADMIN — FUROSEMIDE 20 MG: 20 TABLET ORAL at 18:40

## 2019-07-29 RX ADMIN — METOPROLOL TARTRATE 25 MG: 25 TABLET ORAL at 09:23

## 2019-07-29 RX ADMIN — METOPROLOL TARTRATE 25 MG: 25 TABLET ORAL at 20:48

## 2019-07-29 RX ADMIN — LEVOTHYROXINE SODIUM 100 MCG: 100 TABLET ORAL at 09:23

## 2019-07-29 ASSESSMENT — PAIN SCALES - GENERAL
PAINLEVEL_OUTOF10: 0
PAINLEVEL_OUTOF10: 0

## 2019-07-29 NOTE — PROGRESS NOTES
Physical Therapy  Facility/Department: [address] PROGRESSIVE CARE  Daily Treatment Note  NAME: Masoud Roberts  : 1932  MRN: [id number]    Date of Service: 2019    Discharge Recommendations:  ECF with PT   PT Equipment Recommendations  Equipment Needed: No  Masoud Roberts scored a 8/24 on the AM-PAC short mobility form. Current research shows that an AM-PAC score of 17 or less is typically not associated with a discharge to the patient's home setting. Based on the patients AM-PAC score and their current functional mobility deficits, it is recommended that the patient have 3-5 sessions per week of Physical Therapy at d/c to increase the patients independence. Assessment   Body structures, Functions, Activity limitations: Decreased functional mobility   Assessment: pt continues to be well below his baseline for mobility tasks; feel pt will benefit from continued therapy upon return to [facility] to assist pt in attaining his max potential  Prognosis: Fair  Patient Education: reviewed call light and not getting up without assist  REQUIRES PT FOLLOW UP: Yes  Activity Tolerance  Activity Tolerance: Patient limited by endurance; Patient limited by cognitive status     Patient Diagnosis(es): The primary encounter diagnosis was Shortness of breath. Diagnoses of Congestive heart failure, unspecified HF chronicity, unspecified heart failure type ([address] Hyperkalemia, NSTEMI (non-ST elevated myocardial infarction) ([address] and ANDREINA (acute kidney injury) ([address] were also pertinent to this visit.      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 has a past surgical history that includes hernia repair; Cardiac catheterization (8/11/15); and other surgical history (8/11/15). Restrictions  Restrictions/Precautions  Restrictions/Precautions: Fall Risk  Position Activity Restriction  Other position/activity restrictions: telesitter  Subjective   General  Chart Reviewed: Yes  Additional Pertinent Hx: Patient is an 80 y.o. male who presented to [facility] ED from [facility] on 7/26/19 with SOB. Response To Previous Treatment: Patient with no complaints from previous session.   Family / Caregiver Present: No  Referring Practitioner: Swati Cisneros MD  Subjective  Subjective: pt now agreeable to getting up with therapy  General Comment  Comments: Pt stated \"I'll be leaving soon\" when asked where he was going he pointed upward; pt also stated that he wanted to give his clothes and refrigerator away.; no c/o pain during session          Orientation  Orientation  Overall Orientation Status: Impaired  Orientation Level: Disoriented to time;Disoriented to situation;Disoriented to place;Oriented to person  Cognition   Cognition  Overall Cognitive Status: Exceptions  Arousal/Alertness: Inconsistent responses to stimuli;Delayed responses to stimuli  Following Commands: Inconsistently follows for discharge status.     Natalie Patrick, PT, [id number]   NATALIE PATRICK, PT

## 2019-07-29 NOTE — PROGRESS NOTES
commands  Attention Span: Attends with cues to redirect  Insights: Decreased awareness of deficits  Initiation: Requires cues for all  Sequencing: Requires cues for all  Cognition Comment: difficulty assessing due to difficulty with speech        Plan   Plan  Times per week: 3-5  Times per day: Daily  Current Treatment Recommendations: Strengthening, Functional Mobility Training, Endurance Training, Balance Training, Safety Education & Training, Self-Care / ADL, Cognitive Reorientation, Patient/Caregiver Education & Training, Neuromuscular Re-education    AM-PAC Score        AM-PAC Inpatient Daily Activity Raw Score: 11 (07/29/19 1337)  AM-PAC Inpatient ADL T-Scale Score : 29.04 (07/29/19 1337)  ADL Inpatient CMS 0-100% Score: 70.42 (07/29/19 1337)  ADL Inpatient CMS G-Code Modifier : CL (07/29/19 1337)    Goals  Short term goals  Time Frame for Short term goals: Prior to d/c  Short term goal 1: Pt will perform functional transfers with min/mod Ax1 and LRAD  Short term goal 2: Pt will toilet with min A  Short term goal 3: Pt will groom with setup  Short term goal 4: Pt will perform UB bathing with mod A  Short term goal 5: Pt will increase strength/endurance to achieve the above goals  Long term goals  Time Frame for Long term goals : LTG=STG  Patient Goals   Patient goals : Pt unable to state personal therapy goal       Therapy Time   Individual Concurrent Group Co-treatment   Time In 1304         Time Out 1336         Minutes 32         Timed Code Treatment Minutes: 32 Minutes       Levi Carroll, OTR/L

## 2019-07-29 NOTE — PROGRESS NOTES
Hospitalist Progress Note      PCP: Cathy Marshall MD    Date of Admission: 7/26/2019    Chief Complaint: shortness of breath    Hospital Course: The patient is a 80 y.o. male with hx systolic CHF (EF 15-54%), debility, CVA with left sided hemiparesis, HTN, HLD, type 2 DM, and CAD who presents to [facility] with shortness of breath. Pt is unwilling to engage in a conversation and instead told me that he wants to be \"left alone and wants to go home\". According to the EMR, pt is from a nursing facility and stated that the patient had been complaining of cough and shortness of breath. He was very somnolent when he arrived at the emergency department. When he was attempted to be woken up, he became very agitated and combative. He denied chest pain, abdominal pain, nausea, and vomiting, fever.     He is a DNR-CC. Patient seen by cardiology, and he was not deemed a candidate for an intervention. Subjective: Pt seen and examined. States he is cold. Does not really answer any other questions. Have asked palliative care to figure out guardianship.       Medications:  Reviewed    Infusion Medications    dextrose       Scheduled Medications    furosemide  20 mg Oral BID    aspirin  325 mg Oral Once    atorvastatin  40 mg Oral Daily    clopidogrel  75 mg Oral Daily    pantoprazole sodium  40 mg Oral QAM AC    sodium chloride flush  10 mL Intravenous 2 times per day    aspirin  81 mg Oral Daily    metoprolol tartrate  25 mg Oral BID    sodium polystyrene  15 g Oral Once    levothyroxine  100 mcg Oral Daily    heparin (porcine)  5,000 Units Subcutaneous 3 times per day     PRN Meds: glucose, dextrose, glucagon (rDNA), dextrose, acetaminophen, albuterol sulfate HFA, nitroGLYCERIN, sodium chloride flush, ondansetron      Intake/Output Summary (Last 24 hours) at 7/29/2019 0813  Last data filed at 7/29/2019 0557  Gross per 24 hour   Intake 360 ml   Output --   Net 360 ml       Exam:    /71   Pulse 73

## 2019-07-29 NOTE — PLAN OF CARE
Problem: Falls - Risk of:  Goal: Will remain free from falls  Description  Will remain free from falls  7/28/2019 2304 by Marietta Weinstein RN  Outcome: Ongoing  7/28/2019 1541 by Niurka Thomas RN  Outcome: Ongoing  7/28/2019 1541 by Niurka Thomas RN  Outcome: Ongoing  Goal: Absence of physical injury  Description  Absence of physical injury  7/28/2019 2304 by Marietta Weinstein RN  Outcome: Ongoing  7/28/2019 1541 by Niurka Thomas RN  Outcome: Ongoing  7/28/2019 1541 by Niurka Thomas RN  Outcome: Ongoing     Problem: Risk for Impaired Skin Integrity  Goal: Tissue integrity - skin and mucous membranes  Description  Structural intactness and normal physiological function of skin and  mucous membranes.   7/28/2019 2304 by Marietta Weinstein RN  Outcome: Ongoing  7/28/2019 1541 by Niurka Thomas RN  Outcome: Ongoing  7/28/2019 1541 by Niurka Thomas RN  Outcome: Ongoing     Problem: Infection:  Goal: Will remain free from infection  Description  Will remain free from infection  7/28/2019 2304 by Marietta Weinstein RN  Outcome: Ongoing  7/28/2019 1541 by Niurka Thomas RN  Outcome: Ongoing  7/28/2019 1541 by Niurka Thomas RN  Outcome: Ongoing     Problem: Safety:  Goal: Free from accidental physical injury  Description  Free from accidental physical injury  7/28/2019 2304 by Marietta Weinstein RN  Outcome: Ongoing  7/28/2019 1541 by Niurka Thomas RN  Outcome: Ongoing  7/28/2019 1541 by Niurka Thomas RN  Outcome: Ongoing  Goal: Free from intentional harm  Description  Free from intentional harm  7/28/2019 2304 by Marietta Weinstein RN  Outcome: Ongoing  7/28/2019 1541 by Niurka Thomas RN  Outcome: Ongoing  7/28/2019 1541 by Niurka Thomas RN  Outcome: Ongoing     Problem: Daily Care:  Goal: Daily care needs are met  Description  Daily care needs are met  7/28/2019 2304 by Marietta Weinstein RN  Outcome: Ongoing  7/28/2019 1541 by Niurka Thomas RN  Outcome: Ongoing  7/28/2019 1541 by Miguel Murry planning  Description  Participates in care planning  7/28/2019 2304 by Simeon Bailey RN  Outcome: Ongoing  7/28/2019 1541 by Jeana Chicas RN  Outcome: Ongoing  7/28/2019 1541 by Jeana hCicas RN  Outcome: Ongoing     Problem: Injury - Risk of, Physical Injury:  Goal: Will remain free from falls  Description  Will remain free from falls  7/28/2019 2304 by Simeon Bailey RN  Outcome: Ongoing  7/28/2019 1541 by Jeana Chicas RN  Outcome: Ongoing  7/28/2019 1541 by Jeana Chicas RN  Outcome: Ongoing  Goal: Absence of physical injury  Description  Absence of physical injury  7/28/2019 2304 by Simeon Bailey RN  Outcome: Ongoing  7/28/2019 1541 by Jeana Chicas RN  Outcome: Ongoing  7/28/2019 1541 by Jeana Chicas RN  Outcome: Ongoing     Problem: Mood - Altered:  Goal: Mood stable  Description  Mood stable  7/28/2019 2304 by Simeon Bailey RN  Outcome: Ongoing  7/28/2019 1541 by Jeana Chicas RN  Outcome: Ongoing  7/28/2019 1541 by Jeana Chicas RN  Outcome: Ongoing  Goal: Absence of abusive behavior  Description  Absence of abusive behavior  7/28/2019 2304 by Simeon Bailey RN  Outcome: Ongoing  7/28/2019 1541 by Jeana Chicas RN  Outcome: Ongoing  7/28/2019 1541 by Jeana Chicas RN  Outcome: Ongoing  Goal: Verbalizations of feeling emotionally comfortable while being cared for will increase  Description  Verbalizations of feeling emotionally comfortable while being cared for will increase  7/28/2019 2304 by Simeon Bailey RN  Outcome: Ongoing  7/28/2019 1541 by Jeana Chicas RN  Outcome: Ongoing  7/28/2019 1541 by Jeana Chicas RN  Outcome: Ongoing     Problem: Psychomotor Activity - Altered:  Goal: Absence of psychomotor disturbance signs and symptoms  Description  Absence of psychomotor disturbance signs and symptoms  7/28/2019 2304 by Simeon Bailey RN  Outcome: Ongoing  7/28/2019 1541 by Jeana Chicas RN  Outcome: Ongoing  7/28/2019 1541 by Jeana Chicas RN  Outcome: Ongoing     Problem: Sensory Perception - Impaired:  Goal: Demonstrations of improved sensory functioning will increase  Description  Demonstrations of improved sensory functioning will increase  7/28/2019 2304 by Bonita Montano RN  Outcome: Ongoing  7/28/2019 1541 by Kale Lutz RN  Outcome: Ongoing  7/28/2019 1541 by Kale Lutz RN  Outcome: Ongoing  Goal: Decrease in sensory misperception frequency  Description  Decrease in sensory misperception frequency  7/28/2019 2304 by Bonita Montano RN  Outcome: Ongoing  7/28/2019 1541 by Kale Lutz RN  Outcome: Ongoing  7/28/2019 1541 by Kale Lutz RN  Outcome: Ongoing  Goal: Able to refrain from responding to false sensory perceptions  Description  Able to refrain from responding to false sensory perceptions  7/28/2019 2304 by Bonita Montano RN  Outcome: Ongoing  7/28/2019 1541 by Kale Lutz RN  Outcome: Ongoing  7/28/2019 1541 by Kale Lutz RN  Outcome: Ongoing  Goal: Demonstrates accurate environmental perceptions  Description  Demonstrates accurate environmental perceptions  7/28/2019 2304 by Bonita Montano RN  Outcome: Ongoing  7/28/2019 1541 by Kale Lutz RN  Outcome: Ongoing  7/28/2019 1541 by Kale Lutz RN  Outcome: Ongoing  Goal: Able to distinguish between reality-based and nonreality-based thinking  Description  Able to distinguish between reality-based and nonreality-based thinking  7/28/2019 2304 by Bonita Montano RN  Outcome: Ongoing  7/28/2019 1541 by Kale Lutz RN  Outcome: Ongoing  7/28/2019 1541 by Kale Lutz RN  Outcome: Ongoing  Goal: Able to interrupt nonreality-based thinking  Description  Able to interrupt nonreality-based thinking  7/28/2019 2304 by Bonita Montano RN  Outcome: Ongoing  7/28/2019 1541 by Kale Lutz RN  Outcome: Ongoing  7/28/2019 1541 by Kale Lutz RN  Outcome: Ongoing     Problem: Sleep Pattern Disturbance:  Goal: Appears

## 2019-07-30 VITALS
HEIGHT: 65 IN | WEIGHT: 111.77 LBS | HEART RATE: 81 BPM | OXYGEN SATURATION: 89 % | RESPIRATION RATE: 20 BRPM | BODY MASS INDEX: 18.62 KG/M2 | SYSTOLIC BLOOD PRESSURE: 128 MMHG | DIASTOLIC BLOOD PRESSURE: 64 MMHG | TEMPERATURE: 97.6 F

## 2019-07-30 LAB
GLUCOSE BLD-MCNC: 117 MG/DL (ref 70–99)
GLUCOSE BLD-MCNC: 138 MG/DL (ref 70–99)
PERFORMED ON: ABNORMAL
PERFORMED ON: ABNORMAL

## 2019-07-30 PROCEDURE — 97530 THERAPEUTIC ACTIVITIES: CPT

## 2019-07-30 PROCEDURE — 6360000002 HC RX W HCPCS: Performed by: INTERNAL MEDICINE

## 2019-07-30 PROCEDURE — 97530 THERAPEUTIC ACTIVITIES: CPT | Performed by: PHYSICAL THERAPIST

## 2019-07-30 PROCEDURE — 97535 SELF CARE MNGMENT TRAINING: CPT

## 2019-07-30 PROCEDURE — 6370000000 HC RX 637 (ALT 250 FOR IP): Performed by: INTERNAL MEDICINE

## 2019-07-30 PROCEDURE — 92526 ORAL FUNCTION THERAPY: CPT

## 2019-07-30 RX ORDER — CLOPIDOGREL BISULFATE 75 MG/1
75 TABLET ORAL DAILY
Qty: 30 TABLET | Refills: 3 | DISCHARGE
Start: 2019-07-31 | End: 2019-10-21

## 2019-07-30 RX ORDER — ASPIRIN 81 MG/1
81 TABLET, CHEWABLE ORAL DAILY
Qty: 30 TABLET | Refills: 3 | Status: ON HOLD | DISCHARGE
Start: 2019-07-31 | End: 2019-10-22 | Stop reason: HOSPADM

## 2019-07-30 RX ADMIN — LEVOTHYROXINE SODIUM 100 MCG: 100 TABLET ORAL at 09:12

## 2019-07-30 RX ADMIN — ATORVASTATIN CALCIUM 40 MG: 40 TABLET, FILM COATED ORAL at 09:12

## 2019-07-30 RX ADMIN — CLOPIDOGREL BISULFATE 75 MG: 75 TABLET ORAL at 09:13

## 2019-07-30 RX ADMIN — METOPROLOL TARTRATE 25 MG: 25 TABLET ORAL at 09:12

## 2019-07-30 RX ADMIN — FUROSEMIDE 20 MG: 20 TABLET ORAL at 09:12

## 2019-07-30 RX ADMIN — HEPARIN SODIUM 5000 UNITS: 5000 INJECTION INTRAVENOUS; SUBCUTANEOUS at 05:33

## 2019-07-30 RX ADMIN — ASPIRIN 81 MG 81 MG: 81 TABLET ORAL at 09:12

## 2019-07-30 ASSESSMENT — PAIN SCALES - GENERAL: PAINLEVEL_OUTOF10: 0

## 2019-07-30 NOTE — PLAN OF CARE
Problem: Falls - Risk of:  Goal: Will remain free from falls  Description  Will remain free from falls  7/29/2019 2323 by Mariangel Bustamante RN  Outcome: Ongoing  7/29/2019 1423 by Melony Montoya RN  Outcome: Ongoing  Goal: Absence of physical injury  Description  Absence of physical injury  7/29/2019 2323 by Mariangel Bustamante RN  Outcome: Ongoing  7/29/2019 1423 by Melony Montoya RN  Outcome: Ongoing     Problem: Risk for Impaired Skin Integrity  Goal: Tissue integrity - skin and mucous membranes  Description  Structural intactness and normal physiological function of skin and  mucous membranes.   7/29/2019 2323 by Mariangel Bustamante RN  Outcome: Ongoing  7/29/2019 1423 by Melony Montoya RN  Outcome: Ongoing     Problem: Infection:  Goal: Will remain free from infection  Description  Will remain free from infection  7/29/2019 2323 by Mariangel Bustamante RN  Outcome: Ongoing  7/29/2019 1423 by Melony Montoya RN  Outcome: Ongoing     Problem: Safety:  Goal: Free from accidental physical injury  Description  Free from accidental physical injury  7/29/2019 2323 by Mariangel Bustamante RN  Outcome: Ongoing  7/29/2019 1423 by Melony Montoya RN  Outcome: Ongoing  Goal: Free from intentional harm  Description  Free from intentional harm  7/29/2019 2323 by Mariangel Bustamante RN  Outcome: Ongoing  7/29/2019 1423 by Melony Montoya RN  Outcome: Ongoing     Problem: Daily Care:  Goal: Daily care needs are met  Description  Daily care needs are met  7/29/2019 2323 by Mariangel Bustamante RN  Outcome: Ongoing  7/29/2019 1423 by Melony Montoya RN  Outcome: Ongoing     Problem: Pain:  Goal: Patient's pain/discomfort is manageable  Description  Patient's pain/discomfort is manageable  7/29/2019 2323 by Mariangel Bustamante RN  Outcome: Ongoing  7/29/2019 1423 by Melony Montoya RN  Outcome: Ongoing     Problem: Skin Integrity:  Goal: Skin integrity will stabilize  Description  Skin integrity will stabilize  7/29/2019 2323 by Mariangel Bustamante thinking  7/29/2019 2323 by Lupita Hatchet, RN  Outcome: Ongoing  7/29/2019 1423 by Elizabeth Spence RN  Outcome: Ongoing  Goal: Able to interrupt nonreality-based thinking  Description  Able to interrupt nonreality-based thinking  7/29/2019 2323 by Lupita Hatchet, RN  Outcome: Ongoing  7/29/2019 1423 by Elizabeth Spence RN  Outcome: Ongoing     Problem: Sleep Pattern Disturbance:  Goal: Appears well-rested  Description  Appears well-rested  7/29/2019 2323 by Lupita Hatchet, RN  Outcome: Ongoing  7/29/2019 1423 by Elizabeth Spence RN  Outcome: Ongoing

## 2019-07-30 NOTE — PROGRESS NOTES
Hyperkalemia, NSTEMI (non-ST elevated myocardial infarction) ([facility] and ANDREINA (acute kidney injury) ([facility] were also pertinent to this visit. has a past medical history of Arthritis, Backache, unspecified, CHF (congestive heart failure) ([facility] Coronary atherosclerosis of unspecified type of vessel, native or graft, Debility, unspecified, Dysphagia, oropharyngeal phase, Dysphagia, unspecified(787.20), Esophageal reflux, H/O: CVA, Hemiplegia affecting unspecified side, late effect of cerebrovascular disease, Hyperlipidemia, Hypertension, Hypothyroid, Muscular wasting and disuse atrophy, not elsewhere classified, Nonspecific reaction to tuberculin skin test without active tuberculosis(795.51), Osteoporosis NEC, Osteoporosis, unspecified, Other and unspecified hyperlipidemia, Other specified rehabilitation procedure(V57.89), Personal history of surgery to other organs, Pneumonia, Sepsis(995.91), Stricture and stenosis of esophagus, Type II or unspecified type diabetes mellitus without mention of complication, not stated as uncontrolled, Type II or unspecified type diabetes mellitus without mention of complication, not stated as uncontrolled, Unspecified hypothyroidism, and Urinary tract infection, site not specified. has a past surgical history that includes hernia repair; Cardiac catheterization (8/11/15); and other surgical history (8/11/15). Restrictions  Restrictions/Precautions  Restrictions/Precautions: Fall Risk  Position Activity Restriction  Other position/activity restrictions: telesitter  Subjective   General  Chart Reviewed: Yes  Patient assessed for rehabilitation services?: Yes  Additional Pertinent Hx: Per H&P: \"The patient is a 80 y.o. male with hx systolic CHF (EF 17-93%), debility, CVA with left sided hemiparesis, HTN, HLD, type 2 DM, and CAD who presents to [facility] with shortness of breath.  Pt is unwilling to engage in a conversation and instead told me that he wants to be \"left alone all  Cognition Comment: difficulty assessing due to difficulty with speech          Plan   Plan  Times per week: 3-5  Times per day: Daily  Current Treatment Recommendations: Strengthening, Functional Mobility Training, Endurance Training, Balance Training, Safety Education & Training, Self-Care / ADL, Cognitive Reorientation, Patient/Caregiver Education & Training, Neuromuscular Re-education    AM-PAC Score        AM-[facility] Inpatient Daily Activity Raw Score: 11 (07/30/19 1002)  AM-PAC Inpatient ADL T-Scale Score : 29.04 (07/30/19 1002)  ADL Inpatient CMS 0-100% Score: 70.42 (07/30/19 1002)  ADL Inpatient CMS G-Code Modifier : CL (07/30/19 1002)    Goals  Short term goals  Time Frame for Short term goals: Prior to d/c: progressing all goals 7/30  Short term goal 1: Pt will perform functional transfers with min/mod Ax1 and LRAD  Short term goal 2: Pt will toilet with min A  Short term goal 3: Pt will groom with setup  Short term goal 4: Pt will perform UB bathing with mod A  Short term goal 5: Pt will increase strength/endurance to achieve the above goals  Long term goals  Time Frame for Long term goals : LTG=STG  Patient Goals   Patient goals : Pt unable to state personal therapy goal       Therapy Time   Individual Concurrent Group Co-treatment   Time In 0928         Time Out 1000         Minutes 32         Timed Code Treatment Minutes: Radha 5, OTR/L

## 2019-07-30 NOTE — DISCHARGE INSTR - COC
Continuity of Care Form    Patient Name: Suleman Larsen   :  1932  MRN:  [id number]    Admit date:  2019  Discharge date:  19    Code Status Order: [facility]   Advance Directives:   Advance Care Flowsheet Documentation     Date/Time Healthcare Directive Type of Healthcare Directive Copy in [address] Agent's Name Healthcare Agent's Phone Number    19 [phone]  No, patient does not have an advance directive for healthcare treatment  --  --  --  --  --    19 06:50:31  No, patient does not have an advance directive for healthcare treatment  --  --  --  --  --          Admitting Physician:  Sandoval Nesbitt MD  PCP: Pramod Merlos MD    Discharging Nurse: [facility] Unit/Room#: Q1Y-[id number]/5700-27  Discharging Unit Phone Number: [phone]    Emergency Contact:   Extended Emergency Contact Information  Primary Emergency Contact: Rosa Maria Stephensonsara  Address: [address] Phone: [phone]  Mobile Phone: [phone]  Relation: Other  Secondary Emergency Contact: [address] Phone: [phone]  Work Phone: [phone]  Mobile Phone: [phone]  Relation: Other    Past Surgical History:  Past Surgical History:   Procedure Laterality Date    CARDIAC CATHETERIZATION  8/11/15    HERNIA REPAIR      OTHER SURGICAL HISTORY  8/11/15    Loop recorder       Immunization History:   Immunization History   Administered Date(s) Administered    Influenza Virus Vaccine 10/04/2011    Influenza, High Dose (Fluzone 65 yrs and older) 10/07/2014    Tdap (Boostrix, Adacel) 2018       Active Problems:  Patient Active Problem List   Diagnosis Code    Other and unspecified hyperlipidemia E78.5    Essential hypertension, benign I10    Diabetes mellitus, type II ([address] E11.9    History of CVA (cerebrovascular accident) Z86.73    Arthritis of hand M19.049   [facility] J18.9    Sepsis due to urinary tract infection (HCC) A41.9, N39.0    Syncope R55    Chest pain R07.9    Syncope and collapse R55    Coronary artery disease involving native coronary artery without angina pectoris I25.10    Encounter for electronic analysis of reveal event recorder Z45.09    Paroxysmal atrial fibrillation (HCC) I48.0    NSTEMI (non-ST elevated myocardial infarction) ([facility] I21.4    Neurocognitive disorder R41.9    Delirium R41.0       Isolation/Infection:   Isolation          No Isolation            Nurse Assessment:  Last Vital Signs: /64   Pulse 81   Temp 97.6 °F (36.4 °C) (Axillary)   Resp 20   Ht 5' 5\" (1.651 m)   Wt 111 lb 12.4 oz (50.7 kg)   SpO2 (!) 89%   BMI 18.60 kg/m²     Last documented pain score (0-10 scale): Pain Level: 0  Last Weight:   Wt Readings from Last 1 Encounters:   07/30/19 111 lb 12.4 oz (50.7 kg)     Mental Status:  disoriented and alert    IV Access:  - None    Nursing Mobility/ADLs:  Walking   Assisted  Transfer  Assisted  Bathing  Assisted  Dressing  Assisted  Toileting  Assisted  Feeding  Assisted  Med Admin  Assisted  Med Delivery   whole    Wound Care Documentation and Therapy:        Elimination:  Continence:   · Bowel: Yes  · Bladder: No  Urinary Catheter: None   Colostomy/Ileostomy/Ileal Conduit: No       Date of Last BM: 7/30/2019    Intake/Output Summary (Last 24 hours) at 7/30/2019 1107  Last data filed at 7/30/2019 0559  Gross per 24 hour   Intake 480 ml   Output --   Net 480 ml     I/O last 3 completed shifts: In: 600 [P.O.:600]  Out: -     Safety Concerns:     [place], At Risk for Falls and aggressive at times    Impairments/Disabilities:      Vision and Hearing    Nutrition Therapy:  Current Nutrition Therapy:   - Oral Diet:  Dysphagia 1 pureed    Routes of Feeding: Oral  Liquids:  Thin Liquids  Daily Fluid Restriction: no  Last Modified Barium Swallow with Video (Video Swallowing Test): not done    Treatments at the Time of Hospital

## 2019-07-30 NOTE — PROGRESS NOTES
Physical Therapy  Facility/Department: [address] PROGRESSIVE CARE  Daily Treatment Note  NAME: Barrett Lai  : 1932  MRN: [id number]    Date of Service: 2019    Discharge Recommendations:  ECF with PT, 3-5 sessions per week   PT Equipment Recommendations  Equipment Needed: No  aBrrett Lai scored a  on the AM-PAC short mobility form. Current research shows that an AM-PAC score of 17 or less is typically not associated with a discharge to the patient's home setting. Based on the patients AM-PAC score and their current functional mobility deficits, it is recommended that the patient have 3-5 sessions per week of Physical Therapy at d/ to increase the patients independence. Assessment   Body structures, Functions, Activity limitations: Decreased functional mobility   Assessment: pt continues to be well below his baseline for mobility tasks; feel pt will benefit from continued therapy upon return to [facility] to assist pt in attaining his max potential  Prognosis: Fair;Good  Patient Education: reviewed call light and not getting up without assist  REQUIRES PT FOLLOW UP: Yes  Activity Tolerance  Activity Tolerance: Patient limited by endurance     Patient Diagnosis(es): The primary encounter diagnosis was Shortness of breath. Diagnoses of Congestive heart failure, unspecified HF chronicity, unspecified heart failure type ([address] Hyperkalemia, NSTEMI (non-ST elevated myocardial infarction) ([address] and ANDREINA (acute kidney injury) ([address] were also pertinent to this visit.      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 has a past surgical history that includes hernia repair; Cardiac catheterization (8/11/15); and other surgical history (8/11/15). Restrictions  Restrictions/Precautions  Restrictions/Precautions: Fall Risk  Position Activity Restriction  Other position/activity restrictions: telesitter  Subjective   General  Additional Pertinent Hx: Patient is an 80 y.o. male who presented to [facility] ED from [facility] on 7/26/19 with SOB. Response To Previous Treatment: Patient with no complaints from previous session.   Family / Caregiver Present: No  Referring Practitioner: Virgilio Chicas MD  Subjective  Subjective: pt agreeable to getting up with therapy; wants to go to [place]; pt denies any pain          Orientation  Orientation  Overall Orientation Status: Impaired  Orientation Level: Oriented to person  Cognition   Cognition  Overall Cognitive Status: Exceptions  Arousal/Alertness: Inconsistent responses to stimuli;Delayed responses to stimuli  Following Commands: Inconsistently follows commands  Attention Span: Attends with cues to redirect  Insights: Decreased awareness of deficits  Initiation: Requires cues for all  Sequencing: Requires cues for all  Cognition Comment: difficulty assessing due to difficulty with speech  Objective   Bed mobility  Supine to Sit: Minimal assistance(HOB elevated)  Sit to Supine: Unable to assess(up in chair after session)  Scooting: Supervision  Transfers  Sit to Stand: Minimal Assistance; Moderate Assistance;2 Person Assistance(min/mod A of 2 without steady; and min/mod A of 1 with stedy)  Stand to sit: Minimal Assistance; Moderate Assistance;2 Person Assistance(min/mod A of 2 without steady; and min/mod A of 1 with stedy)  Bed to Chair: Dependent/Total(with stedy)  Comment: pt stood with min/mod A of 2 to use urinal                     Comment: assisted with positioning in chair for comfort and pressure relief; waffle cushion in chair and LE elevated; assisted with standing so pt could use urinal with OT assist              AM-PAC Score  AM-PAC Inpatient Mobility Raw Score : 11 (07/30/19 1004)  AM-PAC Inpatient T-Scale Score : 33.86 (07/30/19 1004)  Mobility Inpatient CMS 0-100% Score: 72.57 (07/30/19 1004)  Mobility Inpatient CMS G-Code Modifier : CL (07/30/19 1004)          Goals  Short term goals  Time Frame for Short term goals: by acute discharge - all goals ongoing 7-30  Short term goal 1: sit<>stand with min A of one person  Short term goal 2: assess ambulation as appropriate  Patient Goals   Patient goals : unable to assess    Plan    Plan  Times per week: 2-3x/week  Current Treatment Recommendations: Functional Mobility Training  Safety Devices  Type of devices: Call light within reach, Chair alarm in place, Left in chair, Nurse notified, Crissy Foss in use     Therapy Time   Individual Concurrent Group Co-treatment   Time In 0928         Time Out 1004         Minutes 39              If patient is discharged prior to the next physical therapy visit;  Please see last written PT note for discharge status.     Natalie Patrick, PT, [id number]   NATALIE PATRICK, PT

## 2019-07-30 NOTE — PROGRESS NOTES
STRAWS  Recommended Form of Meds: Crushed in puree as able    Strategies:   Compensatory Swallowing Strategies: Small bites/sips, Assist feed, Eat/Feed slowly, Remain upright for 30-45 minutes after meals, Upright as possible for all oral intake    Education:  Consulted and agree with results and recommendations: Patient, RN  Patient Education: Pt was given the results and rec's of this eval.  Patient Education Response: No evidence of learning    Prognosis for dysphagia:   fair    Plan:     Continue Dysphagia Therapy: yes  Interventions: Therapeutic Interventions: Patient/Family education, Diet tolerance monitoring  Duration/Frequency of therapy while on unit: Duration/Frequency of Treatment  Duration/Frequency of Treatment: Pt will be seen 3-5 times a week, for 15-30 minute sessions, until discharge from the acute care hospital.  Discharge Instructions:   Anticipate Yes___ for further skilled Speech Therapy for Dysphagia at discharge    This note serves as a D/C Summary in the event that this patient is discharged prior to the next therapy session.     Coded treatment time: 0  Total treatment time: 25    Electronically signed by SAMANTHA Coleman on 7/30/2019 at 9:02 AM

## 2019-07-31 LAB
BLOOD CULTURE, ROUTINE: NORMAL
CULTURE, BLOOD 2: NORMAL

## 2019-10-21 ENCOUNTER — APPOINTMENT (OUTPATIENT)
Dept: GENERAL RADIOLOGY | Age: 84
DRG: 682 | End: 2019-10-21
Payer: MEDICARE

## 2019-10-21 ENCOUNTER — HOSPITAL ENCOUNTER (INPATIENT)
Age: 84
LOS: 1 days | Discharge: HOSPICE | DRG: 682 | End: 2019-10-22
Attending: EMERGENCY MEDICINE | Admitting: INTERNAL MEDICINE
Payer: MEDICARE

## 2019-10-21 ENCOUNTER — APPOINTMENT (OUTPATIENT)
Dept: CT IMAGING | Age: 84
DRG: 682 | End: 2019-10-21
Payer: MEDICARE

## 2019-10-21 DIAGNOSIS — S02.2XXA CLOSED FRACTURE OF NASAL BONE, INITIAL ENCOUNTER: ICD-10-CM

## 2019-10-21 DIAGNOSIS — A41.9 SEPTICEMIA (HCC): ICD-10-CM

## 2019-10-21 DIAGNOSIS — N19 UREMIA: ICD-10-CM

## 2019-10-21 DIAGNOSIS — N30.01 ACUTE CYSTITIS WITH HEMATURIA: ICD-10-CM

## 2019-10-21 DIAGNOSIS — E87.0 HYPERNATREMIA: ICD-10-CM

## 2019-10-21 DIAGNOSIS — N17.9 AKI (ACUTE KIDNEY INJURY) (HCC): ICD-10-CM

## 2019-10-21 DIAGNOSIS — I62.03 CHRONIC SUBDURAL HEMATOMA (HCC): Primary | ICD-10-CM

## 2019-10-21 DIAGNOSIS — E87.5 HYPERKALEMIA: ICD-10-CM

## 2019-10-21 DIAGNOSIS — E87.20 LACTIC ACID ACIDOSIS: ICD-10-CM

## 2019-10-21 LAB
ANION GAP SERPL CALCULATED.3IONS-SCNC: 37 MMOL/L (ref 3–16)
BASE EXCESS VENOUS: -12.1 MMOL/L
BASOPHILS ABSOLUTE: 0 K/UL (ref 0–0.2)
BASOPHILS RELATIVE PERCENT: 0.2 %
BILIRUBIN URINE: ABNORMAL
BLOOD, URINE: ABNORMAL
BUN BLDV-MCNC: 187 MG/DL (ref 7–20)
CALCIUM SERPL-MCNC: 9 MG/DL (ref 8.3–10.6)
CARBOXYHEMOGLOBIN: 4 %
CASTS 2: ABNORMAL /LPF
CASTS: ABNORMAL /LPF
CHLORIDE BLD-SCNC: 109 MMOL/L (ref 99–110)
CLARITY: ABNORMAL
CO2: 11 MMOL/L (ref 21–32)
COLOR: ABNORMAL
COMMENT UA: ABNORMAL
CREAT SERPL-MCNC: 5.4 MG/DL (ref 0.8–1.3)
EKG ATRIAL RATE: 96 BPM
EKG DIAGNOSIS: NORMAL
EKG Q-T INTERVAL: 378 MS
EKG QRS DURATION: 136 MS
EKG QTC CALCULATION (BAZETT): 490 MS
EKG R AXIS: -58 DEGREES
EKG T AXIS: 116 DEGREES
EKG VENTRICULAR RATE: 101 BPM
EOSINOPHILS ABSOLUTE: 0 K/UL (ref 0–0.6)
EOSINOPHILS RELATIVE PERCENT: 0 %
EPITHELIAL CELLS, UA: 6 /HPF (ref 0–5)
GFR AFRICAN AMERICAN: 12
GFR NON-AFRICAN AMERICAN: 10
GLUCOSE BLD-MCNC: 141 MG/DL (ref 70–99)
GLUCOSE BLD-MCNC: 99 MG/DL (ref 70–99)
GLUCOSE URINE: NEGATIVE MG/DL
HCO3 VENOUS: 14 MMOL/L (ref 23–29)
HCT VFR BLD CALC: 50.5 % (ref 40.5–52.5)
HEMOGLOBIN: 15.3 G/DL (ref 13.5–17.5)
KETONES, URINE: ABNORMAL MG/DL
LACTIC ACID: 5.2 MMOL/L (ref 0.4–2)
LEUKOCYTE ESTERASE, URINE: ABNORMAL
LYMPHOCYTES ABSOLUTE: 0.7 K/UL (ref 1–5.1)
LYMPHOCYTES RELATIVE PERCENT: 7.4 %
MCH RBC QN AUTO: 30.9 PG (ref 26–34)
MCHC RBC AUTO-ENTMCNC: 30.4 G/DL (ref 31–36)
MCV RBC AUTO: 101.8 FL (ref 80–100)
METHEMOGLOBIN VENOUS: 0.6 %
MICROSCOPIC EXAMINATION: YES
MONOCYTES ABSOLUTE: 0.8 K/UL (ref 0–1.3)
MONOCYTES RELATIVE PERCENT: 8.8 %
NEUTROPHILS ABSOLUTE: 7.5 K/UL (ref 1.7–7.7)
NEUTROPHILS RELATIVE PERCENT: 83.6 %
NITRITE, URINE: NEGATIVE
O2 CONTENT, VEN: 20 ML/DL
O2 SAT, VEN: 95 %
O2 THERAPY: ABNORMAL
PCO2, VEN: 31.8 MMHG (ref 40–50)
PDW BLD-RTO: 24.8 % (ref 12.4–15.4)
PERFORMED ON: ABNORMAL
PH UA: 5 (ref 5–8)
PH VENOUS: 7.26 (ref 7.35–7.45)
PLATELET # BLD: 116 K/UL (ref 135–450)
PMV BLD AUTO: 8.6 FL (ref 5–10.5)
PO2, VEN: 84 MMHG
POTASSIUM REFLEX MAGNESIUM: 7.5 MMOL/L (ref 3.5–5.1)
POTASSIUM SERPL-SCNC: 6.4 MMOL/L (ref 3.5–5.1)
PRO-BNP: ABNORMAL PG/ML (ref 0–449)
PROTEIN UA: >=300 MG/DL
RBC # BLD: 4.96 M/UL (ref 4.2–5.9)
RBC UA: 100 /HPF (ref 0–4)
SODIUM BLD-SCNC: 157 MMOL/L (ref 136–145)
SPECIFIC GRAVITY UA: 1.02 (ref 1–1.03)
TCO2 CALC VENOUS: 15 MMOL/L
TOTAL CK: 372 U/L (ref 39–308)
URINE REFLEX TO CULTURE: YES
URINE TYPE: ABNORMAL
UROBILINOGEN, URINE: 1 E.U./DL
WBC # BLD: 9 K/UL (ref 4–11)
WBC UA: 46 /HPF (ref 0–5)

## 2019-10-21 PROCEDURE — 6360000002 HC RX W HCPCS: Performed by: INTERNAL MEDICINE

## 2019-10-21 PROCEDURE — 83880 ASSAY OF NATRIURETIC PEPTIDE: CPT

## 2019-10-21 PROCEDURE — 6360000002 HC RX W HCPCS: Performed by: EMERGENCY MEDICINE

## 2019-10-21 PROCEDURE — 70486 CT MAXILLOFACIAL W/O DYE: CPT

## 2019-10-21 PROCEDURE — 2580000003 HC RX 258: Performed by: EMERGENCY MEDICINE

## 2019-10-21 PROCEDURE — 96376 TX/PRO/DX INJ SAME DRUG ADON: CPT

## 2019-10-21 PROCEDURE — 96367 TX/PROPH/DG ADDL SEQ IV INF: CPT

## 2019-10-21 PROCEDURE — 87086 URINE CULTURE/COLONY COUNT: CPT

## 2019-10-21 PROCEDURE — 82803 BLOOD GASES ANY COMBINATION: CPT

## 2019-10-21 PROCEDURE — 85025 COMPLETE CBC W/AUTO DIFF WBC: CPT

## 2019-10-21 PROCEDURE — 93005 ELECTROCARDIOGRAM TRACING: CPT | Performed by: EMERGENCY MEDICINE

## 2019-10-21 PROCEDURE — 81001 URINALYSIS AUTO W/SCOPE: CPT

## 2019-10-21 PROCEDURE — 93010 ELECTROCARDIOGRAM REPORT: CPT | Performed by: INTERNAL MEDICINE

## 2019-10-21 PROCEDURE — 96361 HYDRATE IV INFUSION ADD-ON: CPT

## 2019-10-21 PROCEDURE — 70450 CT HEAD/BRAIN W/O DYE: CPT

## 2019-10-21 PROCEDURE — 82550 ASSAY OF CK (CPK): CPT

## 2019-10-21 PROCEDURE — 83605 ASSAY OF LACTIC ACID: CPT

## 2019-10-21 PROCEDURE — 80048 BASIC METABOLIC PNL TOTAL CA: CPT

## 2019-10-21 PROCEDURE — 99285 EMERGENCY DEPT VISIT HI MDM: CPT

## 2019-10-21 PROCEDURE — 96365 THER/PROPH/DIAG IV INF INIT: CPT

## 2019-10-21 PROCEDURE — 71045 X-RAY EXAM CHEST 1 VIEW: CPT

## 2019-10-21 PROCEDURE — 84132 ASSAY OF SERUM POTASSIUM: CPT

## 2019-10-21 PROCEDURE — 1200000000 HC SEMI PRIVATE

## 2019-10-21 PROCEDURE — 2500000003 HC RX 250 WO HCPCS: Performed by: EMERGENCY MEDICINE

## 2019-10-21 RX ORDER — HEPARIN SODIUM 5000 [USP'U]/ML
5000 INJECTION, SOLUTION INTRAVENOUS; SUBCUTANEOUS EVERY 8 HOURS SCHEDULED
Status: DISCONTINUED | OUTPATIENT
Start: 2019-10-21 | End: 2019-10-22 | Stop reason: HOSPADM

## 2019-10-21 RX ORDER — CALCIUM CHLORIDE 100 MG/ML
1 INJECTION INTRAVENOUS; INTRAVENTRICULAR ONCE
Status: DISCONTINUED | OUTPATIENT
Start: 2019-10-21 | End: 2019-10-21 | Stop reason: DRUGHIGH

## 2019-10-21 RX ORDER — SODIUM CHLORIDE 0.9 % (FLUSH) 0.9 %
10 SYRINGE (ML) INJECTION EVERY 12 HOURS SCHEDULED
Status: DISCONTINUED | OUTPATIENT
Start: 2019-10-21 | End: 2019-10-22 | Stop reason: HOSPADM

## 2019-10-21 RX ORDER — SODIUM CHLORIDE 0.9 % (FLUSH) 0.9 %
10 SYRINGE (ML) INJECTION PRN
Status: DISCONTINUED | OUTPATIENT
Start: 2019-10-21 | End: 2019-10-22 | Stop reason: HOSPADM

## 2019-10-21 RX ORDER — 0.9 % SODIUM CHLORIDE 0.9 %
1000 INTRAVENOUS SOLUTION INTRAVENOUS ONCE
Status: COMPLETED | OUTPATIENT
Start: 2019-10-21 | End: 2019-10-21

## 2019-10-21 RX ORDER — 0.9 % SODIUM CHLORIDE 0.9 %
500 INTRAVENOUS SOLUTION INTRAVENOUS ONCE
Status: DISCONTINUED | OUTPATIENT
Start: 2019-10-21 | End: 2019-10-21

## 2019-10-21 RX ORDER — ONDANSETRON 2 MG/ML
4 INJECTION INTRAMUSCULAR; INTRAVENOUS EVERY 6 HOURS PRN
Status: DISCONTINUED | OUTPATIENT
Start: 2019-10-21 | End: 2019-10-22 | Stop reason: HOSPADM

## 2019-10-21 RX ADMIN — CEFTRIAXONE 1 G: 1 INJECTION, POWDER, FOR SOLUTION INTRAMUSCULAR; INTRAVENOUS at 11:59

## 2019-10-21 RX ADMIN — SODIUM BICARBONATE: 84 INJECTION, SOLUTION INTRAVENOUS at 14:01

## 2019-10-21 RX ADMIN — Medication 50 MEQ: at 12:37

## 2019-10-21 RX ADMIN — SODIUM CHLORIDE 1000 ML: 9 INJECTION, SOLUTION INTRAVENOUS at 11:27

## 2019-10-21 RX ADMIN — SODIUM CHLORIDE 1000 ML: 9 INJECTION, SOLUTION INTRAVENOUS at 12:36

## 2019-10-21 RX ADMIN — HEPARIN SODIUM 5000 UNITS: 5000 INJECTION INTRAVENOUS; SUBCUTANEOUS at 21:46

## 2019-10-21 RX ADMIN — CALCIUM CHLORIDE 1 G: 100 INJECTION INTRAVENOUS; INTRAVENTRICULAR at 13:10

## 2019-10-21 ASSESSMENT — PAIN SCALES - PAIN ASSESSMENT IN ADVANCED DEMENTIA (PAINAD)
BREATHING: 0
NEGVOCALIZATION: 0
NEGVOCALIZATION: 0
FACIALEXPRESSION: 0
BODYLANGUAGE: 0
CONSOLABILITY: 0
CONSOLABILITY: 0
BODYLANGUAGE: 0
BREATHING: 0
FACIALEXPRESSION: 0
TOTALSCORE: 0
TOTALSCORE: 0

## 2019-10-21 ASSESSMENT — PAIN SCALES - GENERAL
PAINLEVEL_OUTOF10: 0

## 2019-10-22 VITALS
BODY MASS INDEX: 15.95 KG/M2 | SYSTOLIC BLOOD PRESSURE: 108 MMHG | DIASTOLIC BLOOD PRESSURE: 73 MMHG | HEART RATE: 99 BPM | RESPIRATION RATE: 16 BRPM | TEMPERATURE: 97.3 F | HEIGHT: 67 IN | WEIGHT: 101.63 LBS | OXYGEN SATURATION: 92 %

## 2019-10-22 LAB
ALBUMIN SERPL-MCNC: 3.1 G/DL (ref 3.4–5)
ANION GAP SERPL CALCULATED.3IONS-SCNC: 26 MMOL/L (ref 3–16)
BASOPHILS ABSOLUTE: 0 K/UL (ref 0–0.2)
BASOPHILS RELATIVE PERCENT: 0.3 %
BUN BLDV-MCNC: 182 MG/DL (ref 7–20)
CALCIUM SERPL-MCNC: 7.9 MG/DL (ref 8.3–10.6)
CHLORIDE BLD-SCNC: 112 MMOL/L (ref 99–110)
CO2: 22 MMOL/L (ref 21–32)
CREAT SERPL-MCNC: 4.7 MG/DL (ref 0.8–1.3)
EOSINOPHILS ABSOLUTE: 0 K/UL (ref 0–0.6)
EOSINOPHILS RELATIVE PERCENT: 0.2 %
GFR AFRICAN AMERICAN: 14
GFR NON-AFRICAN AMERICAN: 12
GLUCOSE BLD-MCNC: 196 MG/DL (ref 70–99)
GLUCOSE BLD-MCNC: 222 MG/DL (ref 70–99)
HCT VFR BLD CALC: 43.4 % (ref 40.5–52.5)
HEMOGLOBIN: 13.3 G/DL (ref 13.5–17.5)
LYMPHOCYTES ABSOLUTE: 0.8 K/UL (ref 1–5.1)
LYMPHOCYTES RELATIVE PERCENT: 6 %
MCH RBC QN AUTO: 30.2 PG (ref 26–34)
MCHC RBC AUTO-ENTMCNC: 30.5 G/DL (ref 31–36)
MCV RBC AUTO: 98.9 FL (ref 80–100)
MONOCYTES ABSOLUTE: 1 K/UL (ref 0–1.3)
MONOCYTES RELATIVE PERCENT: 7.3 %
NEUTROPHILS ABSOLUTE: 11.3 K/UL (ref 1.7–7.7)
NEUTROPHILS RELATIVE PERCENT: 86.2 %
PDW BLD-RTO: 24.1 % (ref 12.4–15.4)
PERFORMED ON: ABNORMAL
PHOSPHORUS: 6.6 MG/DL (ref 2.5–4.9)
PLATELET # BLD: 70 K/UL (ref 135–450)
PLATELET SLIDE REVIEW: ABNORMAL
PMV BLD AUTO: 8.9 FL (ref 5–10.5)
POTASSIUM SERPL-SCNC: 4.5 MMOL/L (ref 3.5–5.1)
RBC # BLD: 4.39 M/UL (ref 4.2–5.9)
SLIDE REVIEW: ABNORMAL
SODIUM BLD-SCNC: 160 MMOL/L (ref 136–145)
URINE CULTURE, ROUTINE: NORMAL
WBC # BLD: 13.1 K/UL (ref 4–11)

## 2019-10-22 PROCEDURE — 2500000003 HC RX 250 WO HCPCS: Performed by: INTERNAL MEDICINE

## 2019-10-22 PROCEDURE — 2580000003 HC RX 258: Performed by: INTERNAL MEDICINE

## 2019-10-22 PROCEDURE — 80069 RENAL FUNCTION PANEL: CPT

## 2019-10-22 PROCEDURE — 6360000002 HC RX W HCPCS: Performed by: INTERNAL MEDICINE

## 2019-10-22 PROCEDURE — 36415 COLL VENOUS BLD VENIPUNCTURE: CPT

## 2019-10-22 PROCEDURE — 94760 N-INVAS EAR/PLS OXIMETRY 1: CPT

## 2019-10-22 PROCEDURE — 85025 COMPLETE CBC W/AUTO DIFF WBC: CPT

## 2019-10-22 RX ORDER — DEXTROSE MONOHYDRATE 25 G/50ML
12.5 INJECTION, SOLUTION INTRAVENOUS PRN
Status: DISCONTINUED | OUTPATIENT
Start: 2019-10-22 | End: 2019-10-22 | Stop reason: HOSPADM

## 2019-10-22 RX ORDER — NICOTINE POLACRILEX 4 MG
15 LOZENGE BUCCAL PRN
Status: DISCONTINUED | OUTPATIENT
Start: 2019-10-22 | End: 2019-10-22 | Stop reason: HOSPADM

## 2019-10-22 RX ORDER — DEXTROSE MONOHYDRATE 50 MG/ML
100 INJECTION, SOLUTION INTRAVENOUS PRN
Status: DISCONTINUED | OUTPATIENT
Start: 2019-10-22 | End: 2019-10-22 | Stop reason: HOSPADM

## 2019-10-22 RX ADMIN — SODIUM BICARBONATE: 84 INJECTION, SOLUTION INTRAVENOUS at 09:52

## 2019-10-22 RX ADMIN — CEFTRIAXONE 1 G: 1 INJECTION, POWDER, FOR SOLUTION INTRAMUSCULAR; INTRAVENOUS at 12:08

## 2019-10-22 RX ADMIN — SODIUM BICARBONATE: 84 INJECTION, SOLUTION INTRAVENOUS at 01:05

## 2019-10-22 RX ADMIN — HEPARIN SODIUM 5000 UNITS: 5000 INJECTION INTRAVENOUS; SUBCUTANEOUS at 06:17

## 2019-10-22 ASSESSMENT — PAIN SCALES - PAIN ASSESSMENT IN ADVANCED DEMENTIA (PAINAD)
BREATHING: 0
TOTALSCORE: 1
NEGVOCALIZATION: 0
FACIALEXPRESSION: 0
NEGVOCALIZATION: 1
BODYLANGUAGE: 0
TOTALSCORE: 0
FACIALEXPRESSION: 0
CONSOLABILITY: 0
BODYLANGUAGE: 0
FACIALEXPRESSION: 0
TOTALSCORE: 1
NEGVOCALIZATION: 0
BODYLANGUAGE: 0
BREATHING: 0
BREATHING: 0
CONSOLABILITY: 0
TOTALSCORE: 0
FACIALEXPRESSION: 0
NEGVOCALIZATION: 0
TOTALSCORE: 0
FACIALEXPRESSION: 0
BODYLANGUAGE: 0
FACIALEXPRESSION: 0
TOTALSCORE: 0
BODYLANGUAGE: 0
BODYLANGUAGE: 0
TOTALSCORE: 1
NEGVOCALIZATION: 0
CONSOLABILITY: 0
BREATHING: 0
BREATHING: 0
NEGVOCALIZATION: 1
BODYLANGUAGE: 0
CONSOLABILITY: 0
BREATHING: 0
CONSOLABILITY: 0
NEGVOCALIZATION: 1
BREATHING: 0
FACIALEXPRESSION: 0
CONSOLABILITY: 0
CONSOLABILITY: 0

## 2019-10-27 NOTE — PLAN OF CARE
Problem: Falls - Risk of:  Goal: Will remain free from falls  Description  Will remain free from falls  7/27/2019 0953 by Cande Raman RN  Outcome: Ongoing  7/27/2019 0052 by Janine Yoder RN  Outcome: Ongoing  Goal: Absence of physical injury  Description  Absence of physical injury  Outcome: Ongoing     Problem: Risk for Impaired Skin Integrity  Goal: Tissue integrity - skin and mucous membranes  Description  Structural intactness and normal physiological function of skin and  mucous membranes.   7/27/2019 0953 by Cande Raman RN  Outcome: Ongoing  7/27/2019 0052 by Janine Yoder RN  Outcome: Ongoing     Problem: Infection:  Goal: Will remain free from infection  Description  Will remain free from infection  Outcome: Ongoing     Problem: Safety:  Goal: Free from accidental physical injury  Description  Free from accidental physical injury  Outcome: Ongoing  Goal: Free from intentional harm  Description  Free from intentional harm  Outcome: Ongoing     Problem: Daily Care:  Goal: Daily care needs are met  Description  Daily care needs are met  Outcome: Ongoing     Problem: Pain:  Goal: Patient's pain/discomfort is manageable  Description  Patient's pain/discomfort is manageable  Outcome: Ongoing     Problem: Skin Integrity:  Goal: Skin integrity will stabilize  Description  Skin integrity will stabilize  Outcome: Ongoing     Problem: Discharge Planning:  Goal: Patients continuum of care needs are met  Description  Patients continuum of care needs are met  Outcome: Ongoing     Problem: Confusion - Acute:  Goal: Absence of continued neurological deterioration signs and symptoms  Description  Absence of continued neurological deterioration signs and symptoms  Outcome: Ongoing  Goal: Mental status will be restored to baseline  Description  Mental status will be restored to baseline  Outcome: Ongoing     Problem: Discharge Planning:  Goal: Ability to perform activities of daily living will to interrupt nonreality-based thinking  Description  Able to interrupt nonreality-based thinking  Outcome: Ongoing     Problem: Sleep Pattern Disturbance:  Goal: Appears well-rested  Description  Appears well-rested  Outcome: Ongoing -takes flomax and tamsulosin  -can resume home dose

## 2022-06-08 NOTE — CARE COORDINATION
DISCHARGE SUMMARY     DATE OF DISCHARGE: 7/30/19    DISCHARGE DESTINATION: [facility]    FACILITY    Level of Care: Intermediate Level of Care    Report Number: [id number]    TRANSPORTATION: [facility] [address] Name:  [address] up Time: 12:30pm    Phone Number: [phone]    COMMENTS: Per Ernestina Birmingham RN she spoke to SW at [facility], they will assist patient in [address] [place] [address] at [facility] if needed. Jose spoke to [facility] at [facility], she is aware of d/c and the potential of needing a guardian if patient not able to make his own decisions moving forward. Patient is ready to return back to his ECF.     Sherie Slater, [address] [facility]  [phone]  7/30/19 at 11:13 AM Pt left a message today at 1:20 for brain MRI results   [phone]    Ok to advise normal MRI?